# Patient Record
Sex: MALE | Race: WHITE | Employment: FULL TIME | URBAN - METROPOLITAN AREA
[De-identification: names, ages, dates, MRNs, and addresses within clinical notes are randomized per-mention and may not be internally consistent; named-entity substitution may affect disease eponyms.]

---

## 2017-07-24 ENCOUNTER — TELEPHONE (OUTPATIENT)
Dept: FAMILY MEDICINE CLINIC | Facility: CLINIC | Age: 57
End: 2017-07-24

## 2017-07-24 NOTE — TELEPHONE ENCOUNTER
In epic it shows that patient is due for a colonoscopy. Need to know if patient has every had a colonoscopy and if so with who and when. Left message for patient to call the office back.

## 2017-09-23 ENCOUNTER — TELEPHONE (OUTPATIENT)
Dept: FAMILY MEDICINE CLINIC | Facility: CLINIC | Age: 57
End: 2017-09-23

## 2017-09-23 DIAGNOSIS — Z00.00 LABORATORY EXAMINATION ORDERED AS PART OF A ROUTINE GENERAL MEDICAL EXAMINATION: Primary | ICD-10-CM

## 2017-09-23 NOTE — TELEPHONE ENCOUNTER
Patient's wife scheduled his physical for 12/27/17 with Dr. Abdulaziz Holder and would like labs called into [facility].

## 2017-10-09 ENCOUNTER — TELEPHONE (OUTPATIENT)
Dept: FAMILY MEDICINE CLINIC | Facility: CLINIC | Age: 57
End: 2017-10-09

## 2017-10-09 NOTE — TELEPHONE ENCOUNTER
Future Appointments  Date Time Provider Refugio Scanloni   12/27/2017 9:30 AM Ally Starkey MD [facility] 3 [facility]     LOV 12/14/16. FIT testing was ordered 12/14/16. Please ask pt if he still has FIT kit.  If not, we can mail it to hime and ask that pt co

## 2017-12-15 ENCOUNTER — LABORATORY ENCOUNTER (OUTPATIENT)
Dept: LAB | Age: 57
End: 2017-12-15
Attending: FAMILY MEDICINE
Payer: COMMERCIAL

## 2017-12-15 DIAGNOSIS — Z00.00 LABORATORY EXAMINATION ORDERED AS PART OF A ROUTINE GENERAL MEDICAL EXAMINATION: ICD-10-CM

## 2017-12-15 PROCEDURE — 86803 HEPATITIS C AB TEST: CPT

## 2017-12-15 PROCEDURE — 84443 ASSAY THYROID STIM HORMONE: CPT

## 2017-12-15 PROCEDURE — 85025 COMPLETE CBC W/AUTO DIFF WBC: CPT

## 2017-12-15 PROCEDURE — 82306 VITAMIN D 25 HYDROXY: CPT

## 2017-12-15 PROCEDURE — 36415 COLL VENOUS BLD VENIPUNCTURE: CPT

## 2017-12-15 PROCEDURE — 80061 LIPID PANEL: CPT

## 2017-12-15 PROCEDURE — 84153 ASSAY OF PSA TOTAL: CPT

## 2017-12-15 PROCEDURE — 80053 COMPREHEN METABOLIC PANEL: CPT

## 2017-12-27 ENCOUNTER — OFFICE VISIT (OUTPATIENT)
Dept: FAMILY MEDICINE CLINIC | Facility: CLINIC | Age: 57
End: 2017-12-27

## 2017-12-27 VITALS
HEIGHT: 73 IN | DIASTOLIC BLOOD PRESSURE: 70 MMHG | WEIGHT: 236 LBS | HEART RATE: 80 BPM | RESPIRATION RATE: 16 BRPM | TEMPERATURE: 98 F | BODY MASS INDEX: 31.28 KG/M2 | SYSTOLIC BLOOD PRESSURE: 132 MMHG

## 2017-12-27 DIAGNOSIS — D69.6 THROMBOCYTOPENIA (HCC): ICD-10-CM

## 2017-12-27 DIAGNOSIS — I10 HYPERTENSION GOAL BP (BLOOD PRESSURE) < 140/90: ICD-10-CM

## 2017-12-27 DIAGNOSIS — E78.5 HYPERLIPIDEMIA WITH TARGET LDL LESS THAN 130: ICD-10-CM

## 2017-12-27 DIAGNOSIS — Z00.00 ANNUAL PHYSICAL EXAM: Primary | ICD-10-CM

## 2017-12-27 PROCEDURE — 99396 PREV VISIT EST AGE 40-64: CPT | Performed by: FAMILY MEDICINE

## 2017-12-27 PROCEDURE — 90471 IMMUNIZATION ADMIN: CPT | Performed by: FAMILY MEDICINE

## 2017-12-27 PROCEDURE — 90686 IIV4 VACC NO PRSV 0.5 ML IM: CPT | Performed by: FAMILY MEDICINE

## 2017-12-27 RX ORDER — LOSARTAN POTASSIUM 25 MG/1
25 TABLET ORAL
Qty: 90 TABLET | Refills: 3 | Status: SHIPPED | OUTPATIENT
Start: 2017-12-27 | End: 2018-12-28

## 2017-12-27 RX ORDER — ATORVASTATIN CALCIUM 40 MG/1
40 TABLET, FILM COATED ORAL DAILY
Qty: 90 TABLET | Refills: 3 | Status: SHIPPED | OUTPATIENT
Start: 2017-12-27 | End: 2018-12-28

## 2017-12-27 NOTE — PROGRESS NOTES
Teresa Caraballo is a 59year old male who presents for a complete physical exam.   HPI:   No chief complaint on file.       His last annual assessment has been over 1 year: Annual Physical due on 12/14/2017       Pt complains of doing well, stable HTN and lip 07:12 AM   TP 7.7 12/15/2017 07:12 AM   ALKPHO 74 12/15/2017 07:12 AM   AST 18 12/15/2017 07:12 AM   ALT 39 12/15/2017 07:12 AM   BILT 1.3 12/15/2017 07:12 AM   TSH 1.690 12/15/2017 07:12 AM         Lab Results  Component Value Date/Time   CHOLEST 145 12/1 Exercise: three times per week, walking. Diet: watches minimally    REVIEW OF SYSTEMS:   A comprehensive 14 point review of systems was completed. Pertinent positives and negatives noted in the the HPI.  Specifically:  GEN:  No fever or fatigue  HEAD: Abdominal: Soft. Normal appearance and bowel sounds are normal. He exhibits no distension and no mass. There is no hepatosplenomegaly. There is no tenderness. No hernia.  Hernia confirmed negative in the right inguinal area and confirmed negative in the l Medications    atorvastatin 40 MG Oral Tab    Hypertension goal BP (blood pressure) < 140/90    Overview     Losartan 25         Current Assessment & Plan     Stable, Continue present management.     Blood Pressure and Cardiac Medications          Losartan

## 2017-12-27 NOTE — ASSESSMENT & PLAN NOTE
Stable, Continue present management.     Blood Pressure and Cardiac Medications          Losartan Potassium 25 MG Oral Tab

## 2018-05-22 ENCOUNTER — PATIENT OUTREACH (OUTPATIENT)
Dept: FAMILY MEDICINE CLINIC | Facility: CLINIC | Age: 58
End: 2018-05-22

## 2018-05-22 NOTE — PROGRESS NOTES
Patient is due for colonoscopy. Left message for patient to call the office. Patient may get FOBT card or colonoscopy.

## 2018-07-12 ENCOUNTER — PATIENT OUTREACH (OUTPATIENT)
Dept: FAMILY MEDICINE CLINIC | Facility: CLINIC | Age: 58
End: 2018-07-12

## 2018-08-09 DIAGNOSIS — Z12.11 SCREEN FOR COLON CANCER: Primary | ICD-10-CM

## 2018-09-10 ENCOUNTER — TELEPHONE (OUTPATIENT)
Dept: FAMILY MEDICINE CLINIC | Facility: CLINIC | Age: 58
End: 2018-09-10

## 2018-09-10 DIAGNOSIS — Z00.00 LABORATORY EXAMINATION ORDERED AS PART OF A ROUTINE GENERAL MEDICAL EXAMINATION: Primary | ICD-10-CM

## 2018-09-10 NOTE — TELEPHONE ENCOUNTER
Pt scheduled his physical for 12/28/18 with Dr. Mario Alberto Gonsalez, please call labs into [facility]. Thank you.

## 2018-10-23 ENCOUNTER — TELEPHONE (OUTPATIENT)
Dept: FAMILY MEDICINE CLINIC | Facility: CLINIC | Age: 58
End: 2018-10-23

## 2018-10-23 DIAGNOSIS — Z12.11 COLON CANCER SCREENING: Primary | ICD-10-CM

## 2018-10-23 NOTE — TELEPHONE ENCOUNTER
Future Appointments   Date Time Provider Refugio Scanloni   12/28/2018  8:30 AM Flako Heredia MD [facility] 3 [facility]     My Chart message sent to pt requesting that he complete FIT testing. Order entered for FIT testing. FIT kit mailed to pt.

## 2018-12-10 ENCOUNTER — APPOINTMENT (OUTPATIENT)
Dept: LAB | Facility: HOSPITAL | Age: 58
End: 2018-12-10
Attending: FAMILY MEDICINE
Payer: COMMERCIAL

## 2018-12-10 DIAGNOSIS — Z12.11 COLON CANCER SCREENING: ICD-10-CM

## 2018-12-10 PROCEDURE — 82274 ASSAY TEST FOR BLOOD FECAL: CPT

## 2018-12-12 ENCOUNTER — APPOINTMENT (OUTPATIENT)
Dept: LAB | Age: 58
End: 2018-12-12
Attending: FAMILY MEDICINE
Payer: COMMERCIAL

## 2018-12-12 DIAGNOSIS — Z00.00 LABORATORY EXAMINATION ORDERED AS PART OF A ROUTINE GENERAL MEDICAL EXAMINATION: ICD-10-CM

## 2018-12-12 PROCEDURE — 80061 LIPID PANEL: CPT

## 2018-12-12 PROCEDURE — 86140 C-REACTIVE PROTEIN: CPT

## 2018-12-12 PROCEDURE — 85652 RBC SED RATE AUTOMATED: CPT

## 2018-12-12 PROCEDURE — 80053 COMPREHEN METABOLIC PANEL: CPT

## 2018-12-12 PROCEDURE — 85027 COMPLETE CBC AUTOMATED: CPT

## 2018-12-12 PROCEDURE — 84443 ASSAY THYROID STIM HORMONE: CPT

## 2018-12-28 ENCOUNTER — OFFICE VISIT (OUTPATIENT)
Dept: FAMILY MEDICINE CLINIC | Facility: CLINIC | Age: 58
End: 2018-12-28

## 2018-12-28 VITALS
WEIGHT: 238 LBS | TEMPERATURE: 97 F | RESPIRATION RATE: 16 BRPM | DIASTOLIC BLOOD PRESSURE: 70 MMHG | HEIGHT: 73 IN | SYSTOLIC BLOOD PRESSURE: 122 MMHG | BODY MASS INDEX: 31.54 KG/M2 | HEART RATE: 70 BPM

## 2018-12-28 DIAGNOSIS — I10 HYPERTENSION GOAL BP (BLOOD PRESSURE) < 140/90: ICD-10-CM

## 2018-12-28 DIAGNOSIS — D69.6 THROMBOCYTOPENIA (HCC): ICD-10-CM

## 2018-12-28 DIAGNOSIS — E88.81 METABOLIC SYNDROME: ICD-10-CM

## 2018-12-28 DIAGNOSIS — E78.5 HYPERLIPIDEMIA WITH TARGET LDL LESS THAN 130: ICD-10-CM

## 2018-12-28 DIAGNOSIS — Z00.00 ANNUAL PHYSICAL EXAM: Primary | ICD-10-CM

## 2018-12-28 PROCEDURE — 90471 IMMUNIZATION ADMIN: CPT | Performed by: FAMILY MEDICINE

## 2018-12-28 PROCEDURE — 90686 IIV4 VACC NO PRSV 0.5 ML IM: CPT | Performed by: FAMILY MEDICINE

## 2018-12-28 PROCEDURE — 99396 PREV VISIT EST AGE 40-64: CPT | Performed by: FAMILY MEDICINE

## 2018-12-28 RX ORDER — LOSARTAN POTASSIUM 25 MG/1
25 TABLET ORAL
Qty: 90 TABLET | Refills: 3 | Status: SHIPPED | OUTPATIENT
Start: 2018-12-28 | End: 2019-12-30

## 2018-12-28 RX ORDER — ATORVASTATIN CALCIUM 40 MG/1
40 TABLET, FILM COATED ORAL DAILY
Qty: 90 TABLET | Refills: 3 | Status: SHIPPED | OUTPATIENT
Start: 2018-12-28 | End: 2020-01-20

## 2018-12-28 NOTE — PROGRESS NOTES
Lizet Engel is a 59year old male who presents for a complete physical exam.   HPI:   Patient presents with:  Physical  Imm/Inj: Flu Shot      His last annual assessment has been over 1 year: Annual Physical due on 12/27/2018       Pt complains of doing 9.0 12/12/2018 07:44 AM    ALB 3.7 12/12/2018 07:44 AM    TP 7.5 12/12/2018 07:44 AM    ALKPHO 82 12/12/2018 07:44 AM    AST 20 12/12/2018 07:44 AM    ALT 35 12/12/2018 07:44 AM    BILT 1.2 12/12/2018 07:44 AM    TSH 2.290 12/12/2018 07:44 AM       Lab Res week, walking. Diet: watches minimally    REVIEW OF SYSTEMS:   A comprehensive 14 point review of systems was completed. Pertinent positives and negatives noted in the the HPI.  Specifically:  GEN:  No fever or fatigue  HEAD:  No headaches  EYES:  No visi present. Carotid bruit not present. Pulmonary/Chest: Effort normal and breath sounds normal. No respiratory distress. He has no decreased breath sounds. He has no wheezes. He has no rales. He exhibits no tenderness. Abdominal: Soft.  Normal appearance and • >=3 YRS TRI  MULTIDOSE VIAL (71529) FLU CLINIC 12/03/2014      Colonoscopy due on 12/06/1960  Influenza Vaccine(1) due on 09/01/2018  Annual Depression Screen due on 12/27/2018  Annual Physical due on 12/27/2018  FIT Colorectal Screening due on 12/10/2

## 2019-03-11 ENCOUNTER — TELEPHONE (OUTPATIENT)
Dept: FAMILY MEDICINE CLINIC | Facility: CLINIC | Age: 59
End: 2019-03-11

## 2019-03-11 NOTE — TELEPHONE ENCOUNTER
LOV 12/28/18. No future appointments. Drake notified pt that his Losartan is on recall. Please prescribe another medication. Routed to Dr Lia Cervantes.

## 2019-03-11 NOTE — TELEPHONE ENCOUNTER
[address] called stating that the patient is on the Losartan and his script that was filled in December was part of the batch recalled. Please call in a new script for a new prescription.

## 2019-04-17 ENCOUNTER — OFFICE VISIT (OUTPATIENT)
Dept: FAMILY MEDICINE CLINIC | Facility: CLINIC | Age: 59
End: 2019-04-17

## 2019-04-17 ENCOUNTER — TELEPHONE (OUTPATIENT)
Dept: FAMILY MEDICINE CLINIC | Facility: CLINIC | Age: 59
End: 2019-04-17

## 2019-04-17 VITALS
WEIGHT: 245 LBS | OXYGEN SATURATION: 96 % | HEART RATE: 101 BPM | SYSTOLIC BLOOD PRESSURE: 126 MMHG | DIASTOLIC BLOOD PRESSURE: 74 MMHG | HEIGHT: 73 IN | BODY MASS INDEX: 32.47 KG/M2 | TEMPERATURE: 100 F

## 2019-04-17 DIAGNOSIS — R68.89 FLU-LIKE SYMPTOMS: Primary | ICD-10-CM

## 2019-04-17 DIAGNOSIS — J10.1 INFLUENZA A: ICD-10-CM

## 2019-04-17 PROCEDURE — 99213 OFFICE O/P EST LOW 20 MIN: CPT | Performed by: FAMILY MEDICINE

## 2019-04-17 PROCEDURE — 87502 INFLUENZA DNA AMP PROBE: CPT | Performed by: FAMILY MEDICINE

## 2019-04-17 RX ORDER — PREDNISONE 20 MG/1
TABLET ORAL
Qty: 10 TABLET | Refills: 0 | Status: SHIPPED | OUTPATIENT
Start: 2019-04-17 | End: 2019-12-04

## 2019-04-17 NOTE — TELEPHONE ENCOUNTER
Spouse Aundrea Avalos calling that pt has a weird cough and fever of 102. Requesting to speak to a nurse.  [facility] [place], [facility]

## 2019-04-17 NOTE — TELEPHONE ENCOUNTER
C/o cold started last week has fever for which she is giving ASA I told her to switch to tylenol he has a dry barky cough for which she has been giving generic robitussin cold.  With the fever and cough I sent her to the urgent care on [address] to be seen she

## 2019-04-17 NOTE — PATIENT INSTRUCTIONS
Take xofluza as directed. Take prednisone-- 2 tabs once daily for 5 days. Take with food. Use otc meds for comfort:  Tylenol for pain and fever. otc cough remedies like delsym will reduce coughing without adding mucus.    Consider applying richie's vap will not make the flu go away faster. But the medicines may help with coughing, sore throat, and congestion in your nose and sinuses. Don’t use a decongestant if you have high blood pressure.   · Stay home until your fever has been gone for at least 24 hour

## 2019-10-21 ENCOUNTER — TELEPHONE (OUTPATIENT)
Dept: FAMILY MEDICINE CLINIC | Facility: CLINIC | Age: 59
End: 2019-10-21

## 2019-10-21 DIAGNOSIS — Z12.5 SCREENING FOR PROSTATE CANCER: ICD-10-CM

## 2019-10-21 DIAGNOSIS — Z00.00 LABORATORY EXAMINATION ORDERED AS PART OF A ROUTINE GENERAL MEDICAL EXAMINATION: ICD-10-CM

## 2019-10-21 NOTE — TELEPHONE ENCOUNTER
Please enter lab orders for the patient's upcoming physical appointment. Physical scheduled:    Your appointments     Date & Time Appointment Department [facility])    Dec 18, 2019 11:45 AM CST Physical - Established with Celeste Tesfaye  [facility] [place]

## 2019-10-22 NOTE — TELEPHONE ENCOUNTER
Diagnoses and all orders for this visit:    Laboratory examination ordered as part of a routine general medical examination  -     COMP METABOLIC PANEL (14); Future  -     LIPID PANEL; Future  -     CBC, PLATELET; NO DIFFERENTIAL;  Future  -     TSH Santosh Harding

## 2019-12-04 ENCOUNTER — TELEPHONE (OUTPATIENT)
Dept: FAMILY MEDICINE CLINIC | Facility: CLINIC | Age: 59
End: 2019-12-04

## 2019-12-04 ENCOUNTER — OFFICE VISIT (OUTPATIENT)
Dept: FAMILY MEDICINE CLINIC | Facility: CLINIC | Age: 59
End: 2019-12-04

## 2019-12-04 ENCOUNTER — APPOINTMENT (OUTPATIENT)
Dept: LAB | Age: 59
End: 2019-12-04
Attending: FAMILY MEDICINE
Payer: COMMERCIAL

## 2019-12-04 VITALS
SYSTOLIC BLOOD PRESSURE: 120 MMHG | TEMPERATURE: 100 F | RESPIRATION RATE: 16 BRPM | DIASTOLIC BLOOD PRESSURE: 60 MMHG | HEART RATE: 72 BPM | WEIGHT: 241 LBS | BODY MASS INDEX: 31.94 KG/M2 | HEIGHT: 73 IN

## 2019-12-04 DIAGNOSIS — Z12.11 COLON CANCER SCREENING: Primary | ICD-10-CM

## 2019-12-04 DIAGNOSIS — R05.9 COUGH: Primary | ICD-10-CM

## 2019-12-04 DIAGNOSIS — Z00.00 LABORATORY EXAMINATION ORDERED AS PART OF A ROUTINE GENERAL MEDICAL EXAMINATION: ICD-10-CM

## 2019-12-04 DIAGNOSIS — Z12.5 SCREENING FOR PROSTATE CANCER: ICD-10-CM

## 2019-12-04 PROCEDURE — 84443 ASSAY THYROID STIM HORMONE: CPT

## 2019-12-04 PROCEDURE — 80053 COMPREHEN METABOLIC PANEL: CPT

## 2019-12-04 PROCEDURE — 99213 OFFICE O/P EST LOW 20 MIN: CPT | Performed by: PHYSICIAN ASSISTANT

## 2019-12-04 PROCEDURE — 80061 LIPID PANEL: CPT

## 2019-12-04 PROCEDURE — 85027 COMPLETE CBC AUTOMATED: CPT

## 2019-12-04 PROCEDURE — 82274 ASSAY TEST FOR BLOOD FECAL: CPT | Performed by: FAMILY MEDICINE

## 2019-12-04 RX ORDER — PREDNISONE 20 MG/1
20 TABLET ORAL DAILY
Qty: 5 TABLET | Refills: 0 | Status: SHIPPED | OUTPATIENT
Start: 2019-12-04 | End: 2019-12-09

## 2019-12-05 NOTE — PROGRESS NOTES
Patient presents with:  Cough: x 3 days,  cough with a funny sound       HISTORY OF PRESENT ILLNESS  Lord Aggarwal is a 59year old male who presents for evaluation of cough. Notes that for the last three days he has been having a persistent cough.  He will Temp: 100.3 °F (37.9 °C)       PHYSICAL EXAM  GENERAL: Alert male in no acute distress. HEAD: Normocephalic, atraumatic. EYES: White conjunctiva, clear sclera. PERRL. EARS: External auditory canals clear bilaterally.  No pain with manipulation of tragu

## 2019-12-18 ENCOUNTER — OFFICE VISIT (OUTPATIENT)
Dept: FAMILY MEDICINE CLINIC | Facility: CLINIC | Age: 59
End: 2019-12-18

## 2019-12-18 VITALS
RESPIRATION RATE: 12 BRPM | DIASTOLIC BLOOD PRESSURE: 82 MMHG | HEART RATE: 64 BPM | SYSTOLIC BLOOD PRESSURE: 138 MMHG | WEIGHT: 241 LBS | TEMPERATURE: 98 F | HEIGHT: 73 IN | BODY MASS INDEX: 31.94 KG/M2

## 2019-12-18 DIAGNOSIS — E78.2 MIXED HYPERLIPIDEMIA: ICD-10-CM

## 2019-12-18 DIAGNOSIS — D69.6 THROMBOCYTOPENIA (HCC): ICD-10-CM

## 2019-12-18 DIAGNOSIS — I10 ESSENTIAL HYPERTENSION: ICD-10-CM

## 2019-12-18 DIAGNOSIS — E88.81 METABOLIC SYNDROME: ICD-10-CM

## 2019-12-18 DIAGNOSIS — Z00.00 ANNUAL PHYSICAL EXAM: Primary | ICD-10-CM

## 2019-12-18 DIAGNOSIS — E66.9 OBESITY (BMI 30.0-34.9): ICD-10-CM

## 2019-12-18 PROBLEM — E66.811 OBESITY (BMI 30.0-34.9): Status: ACTIVE | Noted: 2019-12-18

## 2019-12-18 PROCEDURE — 90471 IMMUNIZATION ADMIN: CPT | Performed by: FAMILY MEDICINE

## 2019-12-18 PROCEDURE — 99396 PREV VISIT EST AGE 40-64: CPT | Performed by: FAMILY MEDICINE

## 2019-12-18 PROCEDURE — 90686 IIV4 VACC NO PRSV 0.5 ML IM: CPT | Performed by: FAMILY MEDICINE

## 2019-12-18 NOTE — PROGRESS NOTES
Velma Healy is a 62year old male who presents for a complete physical exam.   HPI:   Patient presents with:  Physical      Annual Physical due on 12/28/2019      Pt complains of doing well overall, not the best of diet but overall in good health    He s TP 7.9 12/04/2019 08:25 AM    ALKPHO 89 12/04/2019 08:25 AM    AST 17 12/04/2019 08:25 AM    ALT 25 12/04/2019 08:25 AM    BILT 1.4 12/04/2019 08:25 AM    TSH 1.410 12/04/2019 08:25 AM       Lab Results   Component Value Date/Time    CHOLEST 151 12/04/2 sore throat or dental problems  HEART:  No chest pain or palpitations  LUNG:  No SOB, cough or wheeze  GI:  No abdominal pain.   No N/V/D/C  :  No dysuria  MS:  No joint pain or swelling  NEURO:  Denies numbness or tingling  PSYCH:  No mood concerns or an motion. Lymphadenopathy:     He has no cervical adenopathy. He has no axillary adenopathy. Neurological: He is alert and oriented to person, place, and time. He has normal strength and normal reflexes. No cranial nerve deficit or sensory deficit. Assessment & Plan     Stable, Continue present management.     Cholesterol Lowering Medications          atorvastatin 40 MG Oral Tab                    Other    Metabolic syndrome    Overview     A1c 5.3 in 2013, trigs 171         Current Assessment & Plan

## 2019-12-28 DIAGNOSIS — I10 HYPERTENSION GOAL BP (BLOOD PRESSURE) < 140/90: ICD-10-CM

## 2019-12-30 RX ORDER — LOSARTAN POTASSIUM 25 MG/1
TABLET ORAL
Qty: 90 TABLET | Refills: 0 | Status: SHIPPED | OUTPATIENT
Start: 2019-12-30 | End: 2020-03-16

## 2019-12-30 NOTE — TELEPHONE ENCOUNTER
Requested Prescriptions     Pending Prescriptions Disp Refills   • LOSARTAN POTASSIUM 25 MG Oral Tab [Pharmacy Med Name: Losartan Potassium 25 MG Oral Tablet] 90 tablet 0     Sig: TAKE 1 TABLET BY MOUTH ONCE DAILY     LOV 12/18/2019     Patient was asked t

## 2020-01-10 ENCOUNTER — TELEPHONE (OUTPATIENT)
Dept: FAMILY MEDICINE CLINIC | Facility: CLINIC | Age: 60
End: 2020-01-10

## 2020-01-18 DIAGNOSIS — E78.5 HYPERLIPIDEMIA WITH TARGET LDL LESS THAN 130: ICD-10-CM

## 2020-01-20 RX ORDER — ATORVASTATIN CALCIUM 40 MG/1
TABLET, FILM COATED ORAL
Qty: 90 TABLET | Refills: 0 | Status: SHIPPED | OUTPATIENT
Start: 2020-01-20 | End: 2020-04-11

## 2020-01-20 NOTE — TELEPHONE ENCOUNTER
Requested Prescriptions     Pending Prescriptions Disp Refills   • ATORVASTATIN 40 MG Oral Tab [Pharmacy Med Name: Atorvastatin Calcium 40 MG Oral Tablet] 90 tablet 0     Sig: TAKE 1 TABLET BY MOUTH ONCE DAILY     LOV 12/18/2019     Patient was asked to fo

## 2020-03-14 DIAGNOSIS — I10 HYPERTENSION GOAL BP (BLOOD PRESSURE) < 140/90: ICD-10-CM

## 2020-03-16 RX ORDER — LOSARTAN POTASSIUM 25 MG/1
TABLET ORAL
Qty: 90 TABLET | Refills: 0 | Status: SHIPPED | OUTPATIENT
Start: 2020-03-16 | End: 2020-06-15

## 2020-03-16 NOTE — TELEPHONE ENCOUNTER
Requested Prescriptions     Pending Prescriptions Disp Refills   • LOSARTAN POTASSIUM 25 MG Oral Tab [Pharmacy Med Name: Losartan Potassium 25 MG Oral Tablet] 90 tablet 0     Sig: Take 1 tablet by mouth once daily     LOV 12/18/2019         Appointment deepti

## 2020-04-11 DIAGNOSIS — E78.5 HYPERLIPIDEMIA WITH TARGET LDL LESS THAN 130: ICD-10-CM

## 2020-04-11 RX ORDER — ATORVASTATIN CALCIUM 40 MG/1
TABLET, FILM COATED ORAL
Qty: 90 TABLET | Refills: 0 | Status: SHIPPED | OUTPATIENT
Start: 2020-04-11 | End: 2020-07-12

## 2020-06-13 DIAGNOSIS — I10 HYPERTENSION GOAL BP (BLOOD PRESSURE) < 140/90: ICD-10-CM

## 2020-06-15 RX ORDER — LOSARTAN POTASSIUM 25 MG/1
TABLET ORAL
Qty: 90 TABLET | Refills: 0 | Status: SHIPPED | OUTPATIENT
Start: 2020-06-15 | End: 2020-09-21

## 2020-06-15 NOTE — TELEPHONE ENCOUNTER
Requested Prescriptions     Pending Prescriptions Disp Refills   • LOSARTAN POTASSIUM 25 MG Oral Tab [Pharmacy Med Name: Losartan Potassium 25 MG Oral Tablet] 90 tablet 0     Sig: Take 1 tablet by mouth once daily     LOV 12/18/2019       Appointment sched

## 2020-07-11 DIAGNOSIS — E78.5 HYPERLIPIDEMIA WITH TARGET LDL LESS THAN 130: ICD-10-CM

## 2020-07-12 RX ORDER — ATORVASTATIN CALCIUM 40 MG/1
TABLET, FILM COATED ORAL
Qty: 90 TABLET | Refills: 3 | Status: SHIPPED | OUTPATIENT
Start: 2020-07-12 | End: 2021-07-14

## 2020-08-25 ENCOUNTER — OFFICE VISIT (OUTPATIENT)
Dept: FAMILY MEDICINE CLINIC | Facility: CLINIC | Age: 60
End: 2020-08-25

## 2020-08-25 VITALS
HEIGHT: 72.5 IN | DIASTOLIC BLOOD PRESSURE: 76 MMHG | HEART RATE: 72 BPM | RESPIRATION RATE: 16 BRPM | BODY MASS INDEX: 31.94 KG/M2 | SYSTOLIC BLOOD PRESSURE: 134 MMHG | WEIGHT: 238.38 LBS | TEMPERATURE: 98 F

## 2020-08-25 DIAGNOSIS — M62.830 BACK MUSCLE SPASM: Primary | ICD-10-CM

## 2020-08-25 PROCEDURE — 3078F DIAST BP <80 MM HG: CPT | Performed by: FAMILY MEDICINE

## 2020-08-25 PROCEDURE — 3008F BODY MASS INDEX DOCD: CPT | Performed by: FAMILY MEDICINE

## 2020-08-25 PROCEDURE — 99213 OFFICE O/P EST LOW 20 MIN: CPT | Performed by: FAMILY MEDICINE

## 2020-08-25 PROCEDURE — 3075F SYST BP GE 130 - 139MM HG: CPT | Performed by: FAMILY MEDICINE

## 2020-08-25 RX ORDER — CYCLOBENZAPRINE HCL 10 MG
10 TABLET ORAL 3 TIMES DAILY PRN
Qty: 30 TABLET | Refills: 0 | Status: SHIPPED | OUTPATIENT
Start: 2020-08-25 | End: 2020-09-14

## 2020-08-25 NOTE — PROGRESS NOTES
Patient presents with:  Back Pain: x9 days, low back pain. HPI:   Luz Enriquez is a 62year old male who presents to the office for eval of low back pain.   Lifting water in the garden, no pop or quick pain, but later that night felt the pain and tigh

## 2020-09-19 DIAGNOSIS — I10 HYPERTENSION GOAL BP (BLOOD PRESSURE) < 140/90: ICD-10-CM

## 2020-09-21 RX ORDER — LOSARTAN POTASSIUM 25 MG/1
TABLET ORAL
Qty: 90 TABLET | Refills: 0 | Status: SHIPPED | OUTPATIENT
Start: 2020-09-21 | End: 2020-12-21

## 2020-09-21 NOTE — TELEPHONE ENCOUNTER
Requested Prescriptions     Pending Prescriptions Disp Refills   • LOSARTAN POTASSIUM 25 MG Oral Tab [Pharmacy Med Name: Losartan Potassium 25 MG Oral Tablet] 90 tablet 0     Sig: Take 1 tablet by mouth once daily     LOV 8/25/2020         Appointment sche

## 2020-11-20 ENCOUNTER — TELEPHONE (OUTPATIENT)
Dept: FAMILY MEDICINE CLINIC | Facility: CLINIC | Age: 60
End: 2020-11-20

## 2020-11-20 DIAGNOSIS — R73.9 HYPERGLYCEMIA: ICD-10-CM

## 2020-11-20 DIAGNOSIS — Z12.11 SCREEN FOR COLON CANCER: Primary | ICD-10-CM

## 2020-11-20 DIAGNOSIS — Z00.00 LABORATORY EXAMINATION ORDERED AS PART OF A ROUTINE GENERAL MEDICAL EXAMINATION: ICD-10-CM

## 2020-11-20 NOTE — TELEPHONE ENCOUNTER
Pt has an appointment scheduled for a physical 12/23 with Dr. Drew Niño and spouse called asking if there's any way pt can complete FIT test prior to visit?

## 2020-11-20 NOTE — TELEPHONE ENCOUNTER
FIT Test Envelope placed in the drawer by Check In for Luis to .     Please order labs for Luis's upcoming physical 12/23/2020    Routed to Mariangel Bueno

## 2020-11-20 NOTE — TELEPHONE ENCOUNTER
Please enter lab orders for the patient's upcoming physical appointment. Physical scheduled:    Your appointments     Date & Time Appointment Department [facility])    Dec 23, 2020  8:00 AM CST Physical - Established with Gene Quarles  [facility]

## 2020-12-09 ENCOUNTER — LAB ENCOUNTER (OUTPATIENT)
Dept: LAB | Age: 60
End: 2020-12-09
Attending: FAMILY MEDICINE
Payer: COMMERCIAL

## 2020-12-09 DIAGNOSIS — Z00.00 LABORATORY EXAMINATION ORDERED AS PART OF A ROUTINE GENERAL MEDICAL EXAMINATION: ICD-10-CM

## 2020-12-09 DIAGNOSIS — R73.9 HYPERGLYCEMIA: ICD-10-CM

## 2020-12-09 PROCEDURE — 84443 ASSAY THYROID STIM HORMONE: CPT

## 2020-12-09 PROCEDURE — 80061 LIPID PANEL: CPT

## 2020-12-09 PROCEDURE — 83036 HEMOGLOBIN GLYCOSYLATED A1C: CPT

## 2020-12-09 PROCEDURE — 80053 COMPREHEN METABOLIC PANEL: CPT

## 2020-12-09 PROCEDURE — 36415 COLL VENOUS BLD VENIPUNCTURE: CPT

## 2020-12-09 PROCEDURE — 85027 COMPLETE CBC AUTOMATED: CPT

## 2020-12-19 DIAGNOSIS — I10 HYPERTENSION GOAL BP (BLOOD PRESSURE) < 140/90: ICD-10-CM

## 2020-12-21 RX ORDER — LOSARTAN POTASSIUM 25 MG/1
TABLET ORAL
Qty: 90 TABLET | Refills: 0 | Status: SHIPPED | OUTPATIENT
Start: 2020-12-21 | End: 2021-03-08

## 2020-12-21 NOTE — TELEPHONE ENCOUNTER
Requested Prescriptions     Pending Prescriptions Disp Refills   • LOSARTAN POTASSIUM 25 MG Oral Tab [Pharmacy Med Name: Losartan Potassium 25 MG Oral Tablet] 90 tablet 0     Sig: Take 1 tablet by mouth once daily     LOV 8/25/2020     Patient was asked to

## 2020-12-23 ENCOUNTER — OFFICE VISIT (OUTPATIENT)
Dept: FAMILY MEDICINE CLINIC | Facility: CLINIC | Age: 60
End: 2020-12-23

## 2020-12-23 VITALS
WEIGHT: 239 LBS | HEART RATE: 80 BPM | RESPIRATION RATE: 14 BRPM | SYSTOLIC BLOOD PRESSURE: 128 MMHG | BODY MASS INDEX: 32 KG/M2 | DIASTOLIC BLOOD PRESSURE: 70 MMHG | TEMPERATURE: 97 F | OXYGEN SATURATION: 94 %

## 2020-12-23 DIAGNOSIS — D69.6 THROMBOCYTOPENIA (HCC): ICD-10-CM

## 2020-12-23 DIAGNOSIS — I10 ESSENTIAL HYPERTENSION: ICD-10-CM

## 2020-12-23 DIAGNOSIS — E88.81 METABOLIC SYNDROME: ICD-10-CM

## 2020-12-23 DIAGNOSIS — Z80.42 FAMILY HISTORY OF PROSTATE CANCER IN FATHER: ICD-10-CM

## 2020-12-23 DIAGNOSIS — E78.2 MIXED HYPERLIPIDEMIA: ICD-10-CM

## 2020-12-23 DIAGNOSIS — Z00.00 ANNUAL PHYSICAL EXAM: Primary | ICD-10-CM

## 2020-12-23 DIAGNOSIS — Z12.11 SCREENING FOR MALIGNANT NEOPLASM OF COLON: ICD-10-CM

## 2020-12-23 PROCEDURE — 90471 IMMUNIZATION ADMIN: CPT | Performed by: FAMILY MEDICINE

## 2020-12-23 PROCEDURE — 3078F DIAST BP <80 MM HG: CPT | Performed by: FAMILY MEDICINE

## 2020-12-23 PROCEDURE — 90686 IIV4 VACC NO PRSV 0.5 ML IM: CPT | Performed by: FAMILY MEDICINE

## 2020-12-23 PROCEDURE — 3074F SYST BP LT 130 MM HG: CPT | Performed by: FAMILY MEDICINE

## 2020-12-23 PROCEDURE — 99396 PREV VISIT EST AGE 40-64: CPT | Performed by: FAMILY MEDICINE

## 2020-12-23 NOTE — PROGRESS NOTES
Mirian Metcalf is a 56year old male who presents for a complete physical exam.   HPI:   Patient presents with:  Physical  Immunization/Injection: Flu Shot      His last annual assessment has been over 1 year: Annual Physical due on 12/18/2020       Pt comp 12/09/2020 08:49 AM    CREATSERUM 1.17 12/09/2020 08:49 AM    GFR 73 12/15/2017 07:12 AM    CA 9.4 12/09/2020 08:49 AM    ALB 4.2 12/09/2020 08:49 AM    TP 7.9 12/09/2020 08:49 AM    ALKPHO 78 12/09/2020 08:49 AM    AST 25 12/09/2020 08:49 AM    ALT 39 12/ OF SYSTEMS:   A comprehensive 14 point review of systems was completed. Pertinent positives and negatives noted in the the HPI.  Specifically:  GEN:  No fever or fatigue  HEAD:  No headaches  EYES:  No vision change  EARS:  No hearing loss  MOUTH/THROAT: wheezes. He has no rales. He exhibits no tenderness. Abdominal: Soft. Bowel sounds are normal. He exhibits no distension. There is no hepatosplenomegaly. There is no abdominal tenderness. There is no rebound and no guarding. No hernia.  Musculoskeletal: N CPX in 1 years. Assessment:  Problem List Items Addressed This Visit        Cardiovascular    Essential hypertension    Overview     Losartan 25         Current Assessment & Plan     Stable, Continue present management.     Blood Pressure and Cardiac Med

## 2021-03-06 DIAGNOSIS — I10 HYPERTENSION GOAL BP (BLOOD PRESSURE) < 140/90: ICD-10-CM

## 2021-03-08 RX ORDER — LOSARTAN POTASSIUM 25 MG/1
TABLET ORAL
Qty: 90 TABLET | Refills: 0 | Status: SHIPPED | OUTPATIENT
Start: 2021-03-08 | End: 2021-06-22

## 2021-03-08 NOTE — TELEPHONE ENCOUNTER
Requested Prescriptions     Pending Prescriptions Disp Refills   • LOSARTAN POTASSIUM 25 MG Oral Tab [Pharmacy Med Name: Losartan Potassium 25 MG Oral Tablet] 90 tablet 0     Sig: Take 1 tablet by mouth once daily     LOV 12/23/2020     Patient was asked t

## 2021-06-04 ENCOUNTER — HOSPITAL ENCOUNTER (OUTPATIENT)
Age: 61
Discharge: HOME | End: 2021-06-04
Payer: COMMERCIAL

## 2021-06-04 VITALS
HEART RATE: 68 BPM | BODY MASS INDEX: 31.14 KG/M2 | HEIGHT: 73 IN | OXYGEN SATURATION: 96 % | DIASTOLIC BLOOD PRESSURE: 84 MMHG | RESPIRATION RATE: 16 BRPM | TEMPERATURE: 97 F | WEIGHT: 235 LBS | SYSTOLIC BLOOD PRESSURE: 157 MMHG

## 2021-06-04 DIAGNOSIS — H61.23 BILATERAL IMPACTED CERUMEN: Primary | ICD-10-CM

## 2021-06-04 DIAGNOSIS — H65.02 NON-RECURRENT ACUTE SEROUS OTITIS MEDIA OF LEFT EAR: ICD-10-CM

## 2021-06-04 PROCEDURE — 69210 REMOVE IMPACTED EAR WAX UNI: CPT | Performed by: NURSE PRACTITIONER

## 2021-06-04 PROCEDURE — 99213 OFFICE O/P EST LOW 20 MIN: CPT | Performed by: NURSE PRACTITIONER

## 2021-06-04 RX ORDER — AMOXICILLIN 875 MG/1
875 TABLET, COATED ORAL 2 TIMES DAILY
Qty: 20 TABLET | Refills: 0 | Status: SHIPPED | OUTPATIENT
Start: 2021-06-04 | End: 2021-06-14

## 2021-06-04 NOTE — ED PROVIDER NOTES
Patient Seen in: Immediate [address] [place] [address]      History   Patient presents with:  Ear Problem Pain    Stated Complaint: Ear Infection    HPI/Subjective:   49-year-old male presents to immediate care with right ear pain.   Patient states in the past Pulse 68   Temp 97.1 °F (36.2 °C) (Temporal)   Resp 16   Ht 185.4 cm (6' 1\")   Wt 106.6 kg   SpO2 96%   BMI 31.00 kg/m²         Physical Exam  Vitals and nursing note reviewed. Constitutional:       General: He is not in acute distress.      Appearance: importance of following up with Primary care physicican. The patient verbalized understanding of the discharge instructions and plan.                            Disposition and Plan     Clinical Impression:  Bilateral impacted cerumen  (primary encounter d

## 2021-06-20 DIAGNOSIS — I10 HYPERTENSION GOAL BP (BLOOD PRESSURE) < 140/90: ICD-10-CM

## 2021-06-22 RX ORDER — LOSARTAN POTASSIUM 25 MG/1
TABLET ORAL
Qty: 90 TABLET | Refills: 0 | Status: SHIPPED | OUTPATIENT
Start: 2021-06-22 | End: 2021-09-13

## 2021-06-25 ENCOUNTER — TELEPHONE (OUTPATIENT)
Dept: FAMILY MEDICINE CLINIC | Facility: CLINIC | Age: 61
End: 2021-06-25

## 2021-06-25 DIAGNOSIS — H53.9 VISION CHANGES: Primary | ICD-10-CM

## 2021-06-25 NOTE — TELEPHONE ENCOUNTER
1. Vision changes (Primary)  -     OPHTHALMOLOGY - INTERNAL   OK for Dr Chris Stark group thanks    OK to notify.  Thanks, Maco Liu MD

## 2021-06-25 NOTE — TELEPHONE ENCOUNTER
Wife calling with c/o L eye tunnel vision x 3 days. In the morning only. Once a day. Wife would like to know if he needs to see pcp or optometry?  Please advise

## 2021-06-30 ENCOUNTER — TELEPHONE (OUTPATIENT)
Dept: FAMILY MEDICINE CLINIC | Facility: CLINIC | Age: 61
End: 2021-06-30

## 2021-06-30 DIAGNOSIS — H53.40 VISUAL FIELD DEFECT: Primary | ICD-10-CM

## 2021-06-30 NOTE — TELEPHONE ENCOUNTER
Patient was having issues with his tunnel vision was referred to ophthalmologist, had appointment today told everything ok with eyes but recommended to have doppler cartoid scan for plaque.  Patient will be dropping off copy of paperwork from eye doctor for

## 2021-06-30 NOTE — TELEPHONE ENCOUNTER
Phone call to pt and pt aware of orders for US of head/ neck  Pt Will call central [facility]

## 2021-06-30 NOTE — TELEPHONE ENCOUNTER
1. Visual field defect (Primary)  -     Ultrasound Scan Head or Neck; Future; Expected date: 06/30/2021       OK to notify.  Thanks, Vera Dawn MD

## 2021-07-11 DIAGNOSIS — E78.5 HYPERLIPIDEMIA WITH TARGET LDL LESS THAN 130: ICD-10-CM

## 2021-07-13 ENCOUNTER — HOSPITAL ENCOUNTER (OUTPATIENT)
Dept: ULTRASOUND IMAGING | Age: 61
Discharge: HOME | End: 2021-07-13
Attending: FAMILY MEDICINE
Payer: COMMERCIAL

## 2021-07-13 DIAGNOSIS — H53.40 VISUAL FIELD DEFECT: ICD-10-CM

## 2021-07-13 PROCEDURE — 93880 EXTRACRANIAL BILAT STUDY: CPT | Performed by: FAMILY MEDICINE

## 2021-07-14 RX ORDER — ATORVASTATIN CALCIUM 40 MG/1
TABLET, FILM COATED ORAL
Qty: 90 TABLET | Refills: 1 | Status: SHIPPED | OUTPATIENT
Start: 2021-07-14 | End: 2021-12-08

## 2021-07-14 NOTE — TELEPHONE ENCOUNTER
LOV 12/23/2020 physical w/Glas  NOTED    Mixed hyperlipidemia      Overview       Atorvastatin 40           Current Assessment & Plan       Stable, Continue present management.          Last lipid 12/09/2020- abnormal  Will refill until anniversary of physi

## 2021-07-14 NOTE — TELEPHONE ENCOUNTER
Atorvastatin Calcium 40 MG Oral Tablet     Sig: Take 1 tablet by mouth once daily    Disp:  90 tablet    Refills:  0    Start: 7/11/2021    Class: Normal    Non-formulary For: Hyperlipidemia with target LDL less than 130    Last ordered: 1 year ago by Paulina

## 2021-08-04 ENCOUNTER — TELEPHONE (OUTPATIENT)
Dept: FAMILY MEDICINE CLINIC | Facility: CLINIC | Age: 61
End: 2021-08-04

## 2021-08-04 DIAGNOSIS — Z00.00 LABORATORY EXAMINATION ORDERED AS PART OF A ROUTINE GENERAL MEDICAL EXAMINATION: ICD-10-CM

## 2021-08-04 DIAGNOSIS — Z12.5 SCREENING FOR PROSTATE CANCER: ICD-10-CM

## 2021-08-04 NOTE — TELEPHONE ENCOUNTER
Please enter lab orders for the patient's upcoming physical appointment. Physical scheduled:    Your appointments     Date & Time Appointment Department [facility])    Dec 08, 2021  8:30 AM CST Physical - Established with Sundeep Liu  [facility]

## 2021-09-07 DIAGNOSIS — I10 HYPERTENSION GOAL BP (BLOOD PRESSURE) < 140/90: ICD-10-CM

## 2021-09-13 RX ORDER — LOSARTAN POTASSIUM 25 MG/1
TABLET ORAL
Qty: 90 TABLET | Refills: 0 | Status: SHIPPED | OUTPATIENT
Start: 2021-09-13 | End: 2021-12-08

## 2021-09-13 NOTE — TELEPHONE ENCOUNTER
Losartan Potassium 25 MG Oral Tablet     Sig: Take 1 tablet by mouth once daily    Disp:  90 tablet    Refills:  0    Start: 9/7/2021    Class: Normal    Non-formulary For: Hypertension goal BP (blood pressure) < 140/90    Last ordered: 2 months ago by Wallace Adorno

## 2021-11-08 NOTE — TELEPHONE ENCOUNTER
1. Laboratory examination ordered as part of a routine general medical examination  -     Comp Metabolic Panel (14); Future; Expected date: 11/08/2021  -     Lipid Panel;  Future; Expected date: 11/08/2021  -     TSH W Reflex To Free T4; Future; Expected da

## 2021-11-17 ENCOUNTER — LAB ENCOUNTER (OUTPATIENT)
Dept: LAB | Age: 61
End: 2021-11-17
Attending: FAMILY MEDICINE
Payer: COMMERCIAL

## 2021-11-17 DIAGNOSIS — Z12.5 SCREENING FOR PROSTATE CANCER: ICD-10-CM

## 2021-11-17 DIAGNOSIS — Z00.00 LABORATORY EXAMINATION ORDERED AS PART OF A ROUTINE GENERAL MEDICAL EXAMINATION: ICD-10-CM

## 2021-11-17 PROCEDURE — 36415 COLL VENOUS BLD VENIPUNCTURE: CPT

## 2021-11-17 PROCEDURE — 80061 LIPID PANEL: CPT

## 2021-11-17 PROCEDURE — 80053 COMPREHEN METABOLIC PANEL: CPT

## 2021-11-17 PROCEDURE — 85027 COMPLETE CBC AUTOMATED: CPT

## 2021-11-17 PROCEDURE — 84443 ASSAY THYROID STIM HORMONE: CPT

## 2021-12-07 NOTE — ASSESSMENT & PLAN NOTE
Lab Results   Component Value Date    A1C 5.5 12/09/2020     (H) 11/17/2021    HDL 39 (L) 11/17/2021    TRIG 185 (H) 11/17/2021      [x] Low HDL  [x] Elevated FBS  [x] Elevated SBP  [x] Elevated Trigs  [] Increased Waist Circ  Stable working on Time [employer]

## 2021-12-08 ENCOUNTER — OFFICE VISIT (OUTPATIENT)
Dept: FAMILY MEDICINE CLINIC | Facility: CLINIC | Age: 61
End: 2021-12-08

## 2021-12-08 VITALS
SYSTOLIC BLOOD PRESSURE: 122 MMHG | DIASTOLIC BLOOD PRESSURE: 80 MMHG | RESPIRATION RATE: 18 BRPM | WEIGHT: 244.38 LBS | HEIGHT: 73 IN | BODY MASS INDEX: 32.39 KG/M2 | HEART RATE: 76 BPM

## 2021-12-08 DIAGNOSIS — D69.6 THROMBOCYTOPENIA (HCC): ICD-10-CM

## 2021-12-08 DIAGNOSIS — I10 ESSENTIAL HYPERTENSION: ICD-10-CM

## 2021-12-08 DIAGNOSIS — I10 HYPERTENSION GOAL BP (BLOOD PRESSURE) < 140/90: ICD-10-CM

## 2021-12-08 DIAGNOSIS — E78.5 HYPERLIPIDEMIA WITH TARGET LDL LESS THAN 130: ICD-10-CM

## 2021-12-08 DIAGNOSIS — Z12.11 SCREEN FOR COLON CANCER: ICD-10-CM

## 2021-12-08 DIAGNOSIS — E78.2 MIXED HYPERLIPIDEMIA: ICD-10-CM

## 2021-12-08 DIAGNOSIS — E88.81 METABOLIC SYNDROME: ICD-10-CM

## 2021-12-08 DIAGNOSIS — Z00.00 ANNUAL PHYSICAL EXAM: Primary | ICD-10-CM

## 2021-12-08 PROCEDURE — 3074F SYST BP LT 130 MM HG: CPT | Performed by: FAMILY MEDICINE

## 2021-12-08 PROCEDURE — 90750 HZV VACC RECOMBINANT IM: CPT | Performed by: FAMILY MEDICINE

## 2021-12-08 PROCEDURE — 90686 IIV4 VACC NO PRSV 0.5 ML IM: CPT | Performed by: FAMILY MEDICINE

## 2021-12-08 PROCEDURE — 3008F BODY MASS INDEX DOCD: CPT | Performed by: FAMILY MEDICINE

## 2021-12-08 PROCEDURE — 90472 IMMUNIZATION ADMIN EACH ADD: CPT | Performed by: FAMILY MEDICINE

## 2021-12-08 PROCEDURE — 99396 PREV VISIT EST AGE 40-64: CPT | Performed by: FAMILY MEDICINE

## 2021-12-08 PROCEDURE — 90471 IMMUNIZATION ADMIN: CPT | Performed by: FAMILY MEDICINE

## 2021-12-08 PROCEDURE — 3079F DIAST BP 80-89 MM HG: CPT | Performed by: FAMILY MEDICINE

## 2021-12-08 RX ORDER — LOSARTAN POTASSIUM 25 MG/1
25 TABLET ORAL DAILY
Qty: 90 TABLET | Refills: 3 | Status: SHIPPED | OUTPATIENT
Start: 2021-12-08 | End: 2021-12-13

## 2021-12-08 RX ORDER — ATORVASTATIN CALCIUM 40 MG/1
40 TABLET, FILM COATED ORAL DAILY
Qty: 90 TABLET | Refills: 3 | Status: SHIPPED | OUTPATIENT
Start: 2021-12-08

## 2021-12-08 NOTE — PROGRESS NOTES
Jerome Myers is a 59year old male who presents for a complete physical exam.     had concerns including Physical (annual ). Annual Physical due on 12/23/2021      Subjective:    He complains of doing well. [facility]      He smoked tobacco in the past but quit g No results found for this or any previous visit.      Colonoscopy Never done   No recommendations at this time   Colonoscopy Never done  Zoster Vaccines(1 of 2) Never done  FIT/FOBT Colorectal Screening due on 12/04/2020  Influenza Vaccine(1) due on 10/01 Ht 6' 1\" (1.854 m)   Wt 244 lb 6.4 oz (110.9 kg)   BMI 32.24 kg/m²  Estimated body mass index is 32.24 kg/m² as calculated from the following:    Height as of this encounter: 6' 1\" (1.854 m). Weight as of this encounter: 244 lb 6.4 oz (110.9 kg).    Ph Affect: Mood normal.         Behavior: Behavior normal.         Thought Content: Thought content normal.          Assessment & Plan:     Marilin Kelly is a 59year old male who presents for a complete physical exam.   Pt's weight is Body mass index is 32.24 11/17/2021      [x] Low HDL  [x] Elevated FBS  [x] Elevated SBP  [x] Elevated Trigs  [] Increased Waist Circ  Stable working on weight loss and Therapuetic Lifestyle Change    6. Hypertension goal BP (blood pressure) < 140/90  -     Losartan Potassium;  Eulalia Kearney

## 2021-12-11 DIAGNOSIS — I10 HYPERTENSION GOAL BP (BLOOD PRESSURE) < 140/90: ICD-10-CM

## 2021-12-13 RX ORDER — LOSARTAN POTASSIUM 25 MG/1
TABLET ORAL
Qty: 90 TABLET | Refills: 0 | Status: SHIPPED | OUTPATIENT
Start: 2021-12-13

## 2021-12-13 NOTE — TELEPHONE ENCOUNTER
Requested Prescriptions     Pending Prescriptions Disp Refills   • LOSARTAN 25 MG Oral Tab [Pharmacy Med Name: Losartan Potassium 25 MG Oral Tablet] 90 tablet 0     Sig: Take 1 tablet by mouth once daily     LOV 12/8/2021     Patient was asked to follow-up

## 2022-02-16 ENCOUNTER — TELEPHONE (OUTPATIENT)
Dept: FAMILY MEDICINE CLINIC | Facility: CLINIC | Age: 62
End: 2022-02-16

## 2022-07-05 ENCOUNTER — PATIENT OUTREACH (OUTPATIENT)
Dept: FAMILY MEDICINE CLINIC | Facility: CLINIC | Age: 62
End: 2022-07-05

## 2022-07-05 DIAGNOSIS — Z12.11 COLON CANCER SCREENING: Primary | ICD-10-CM

## 2022-07-07 ENCOUNTER — TELEPHONE (OUTPATIENT)
Dept: FAMILY MEDICINE CLINIC | Facility: CLINIC | Age: 62
End: 2022-07-07

## 2022-07-07 DIAGNOSIS — R73.9 HYPERGLYCEMIA: ICD-10-CM

## 2022-07-07 DIAGNOSIS — Z12.5 SCREENING FOR PROSTATE CANCER: ICD-10-CM

## 2022-07-07 DIAGNOSIS — Z00.00 LABORATORY EXAMINATION ORDERED AS PART OF A ROUTINE GENERAL MEDICAL EXAMINATION: ICD-10-CM

## 2022-07-07 NOTE — TELEPHONE ENCOUNTER
Please enter lab orders for the patient's upcoming physical appointment. Physical scheduled: Your appointments     Date & Time Appointment Department [facility])    Dec 07, 2022  9:15 AM CST Physical - Established with Phan Baig MD [facility], [address], [place]  ([employer] [place])            [address] [place] [address] [address] [facility] [phone]         Preferred lab: [facility] LAB [facility] CANCER CTR & RESEARCH INST)     The patient has been notified to complete fasting labs prior to their physical appointment. 1. Laboratory examination ordered as part of a routine general medical examination  -     Comp Metabolic Panel (14); Future; Expected date: 10/03/2022  -     Lipid Panel; Future; Expected date: 10/03/2022  -     TSH W Reflex To Free T4; Future; Expected date: 10/03/2022  -     CBC, Platelet; No Differential; Future; Expected date: 10/03/2022  -     Hemoglobin A1C; Future; Expected date: 10/03/2022  2. Screening for prostate cancer  -     PSA Screen; Future; Expected date: 10/03/2022  3.  Hyperglycemia  -     Hemoglobin A1C; Future; Expected date: 10/03/2022   10/3/2022

## 2022-07-07 NOTE — TELEPHONE ENCOUNTER
Pt's spouse states that his Ensure One kit has an expiration date on 6/12/22.  Per triage do not use and  pt to  another kit

## 2022-10-30 NOTE — PROGRESS NOTES
Patient presents with:  Cough: cough is dry, fever, nose congestion, runny nose x 3 dys tried OTC        SUBJECTIVE:   Chel Rivera is a 59year old male who presents complaining of flu-like symptoms of fever to 102, malaise, fatigue, chills, myalgias, co --Skin:  No problems with hair or nails. No rash.   No new skin lesions.  --HEENT: As above and denies problems with eyes, ears, mouth, and neck  --Respiratory: As above and no wheezing, changes in voice or shortness of breath  --Cardiovascular:  No chest If no better in 2-3 days, or if symptoms worsen, follow-up for further evaluation. Influenza (Adult)    Influenza is also called the flu. It is a viral illness that affects the air passages of your lungs. It is different from the common cold.  Sienna Kay Follow up with your healthcare provider, or as advised, if you are not getting better over the next week. If you are age 72 or older, talk with your provider about getting a pneumococcal vaccine every 5 years.  You should also get this vaccine if you have Abdominal Pain, N/V/D

## 2022-11-16 ENCOUNTER — LAB ENCOUNTER (OUTPATIENT)
Dept: LAB | Age: 62
End: 2022-11-16
Attending: FAMILY MEDICINE
Payer: COMMERCIAL

## 2022-11-16 DIAGNOSIS — R73.9 HYPERGLYCEMIA: ICD-10-CM

## 2022-11-16 DIAGNOSIS — Z12.5 SCREENING FOR PROSTATE CANCER: ICD-10-CM

## 2022-11-16 DIAGNOSIS — Z00.00 LABORATORY EXAMINATION ORDERED AS PART OF A ROUTINE GENERAL MEDICAL EXAMINATION: ICD-10-CM

## 2022-11-16 LAB
ALBUMIN SERPL-MCNC: 3.9 G/DL (ref 3.4–5)
ALBUMIN/GLOB SERPL: 1.1 {RATIO} (ref 1–2)
ALP LIVER SERPL-CCNC: 72 U/L
ALT SERPL-CCNC: 46 U/L
ANION GAP SERPL CALC-SCNC: 2 MMOL/L (ref 0–18)
AST SERPL-CCNC: 29 U/L (ref 15–37)
BILIRUB SERPL-MCNC: 1.7 MG/DL (ref 0.1–2)
BUN BLD-MCNC: 15 MG/DL (ref 7–18)
CALCIUM BLD-MCNC: 9.4 MG/DL (ref 8.5–10.1)
CHLORIDE SERPL-SCNC: 106 MMOL/L (ref 98–112)
CHOLEST SERPL-MCNC: 154 MG/DL (ref ?–200)
CO2 SERPL-SCNC: 30 MMOL/L (ref 21–32)
COMPLEXED PSA SERPL-MCNC: 1.69 NG/ML (ref ?–4)
CREAT BLD-MCNC: 1.19 MG/DL
ERYTHROCYTE [DISTWIDTH] IN BLOOD BY AUTOMATED COUNT: 12.8 %
EST. AVERAGE GLUCOSE BLD GHB EST-MCNC: 123 MG/DL (ref 68–126)
FASTING PATIENT LIPID ANSWER: YES
FASTING STATUS PATIENT QL REPORTED: YES
GFR SERPLBLD BASED ON 1.73 SQ M-ARVRAT: 69 ML/MIN/1.73M2 (ref 60–?)
GLOBULIN PLAS-MCNC: 3.5 G/DL (ref 2.8–4.4)
GLUCOSE BLD-MCNC: 123 MG/DL (ref 70–99)
HBA1C MFR BLD: 5.9 % (ref ?–5.7)
HCT VFR BLD AUTO: 43.7 %
HDLC SERPL-MCNC: 44 MG/DL (ref 40–59)
HGB BLD-MCNC: 15.3 G/DL
LDLC SERPL CALC-MCNC: 74 MG/DL (ref ?–100)
MCH RBC QN AUTO: 30.2 PG (ref 26–34)
MCHC RBC AUTO-ENTMCNC: 35 G/DL (ref 31–37)
MCV RBC AUTO: 86.4 FL
NONHDLC SERPL-MCNC: 110 MG/DL (ref ?–130)
OSMOLALITY SERPL CALC.SUM OF ELEC: 288 MOSM/KG (ref 275–295)
PLATELET # BLD AUTO: 161 10(3)UL (ref 150–450)
POTASSIUM SERPL-SCNC: 4.1 MMOL/L (ref 3.5–5.1)
PROT SERPL-MCNC: 7.4 G/DL (ref 6.4–8.2)
RBC # BLD AUTO: 5.06 X10(6)UL
SODIUM SERPL-SCNC: 138 MMOL/L (ref 136–145)
TRIGL SERPL-MCNC: 219 MG/DL (ref 30–149)
TSI SER-ACNC: 2.42 MIU/ML (ref 0.36–3.74)
VLDLC SERPL CALC-MCNC: 34 MG/DL (ref 0–30)
WBC # BLD AUTO: 7.4 X10(3) UL (ref 4–11)

## 2022-11-16 PROCEDURE — 80061 LIPID PANEL: CPT

## 2022-11-16 PROCEDURE — 85027 COMPLETE CBC AUTOMATED: CPT

## 2022-11-16 PROCEDURE — 84443 ASSAY THYROID STIM HORMONE: CPT

## 2022-11-16 PROCEDURE — 83036 HEMOGLOBIN GLYCOSYLATED A1C: CPT

## 2022-11-16 PROCEDURE — 36415 COLL VENOUS BLD VENIPUNCTURE: CPT

## 2022-11-16 PROCEDURE — 80053 COMPREHEN METABOLIC PANEL: CPT

## 2022-12-06 NOTE — ASSESSMENT & PLAN NOTE
As for his Pre-Diabetes, it is well controlled, no significant medication side effects noted. Recommendations are: continue present meds, lose weight by increased dietary compliance and exercise and will check labs as ordered.     Lab Results   Component Value Date    A1C 5.9 (H) 11/16/2022    A1C 5.5 12/09/2020

## 2022-12-07 ENCOUNTER — OFFICE VISIT (OUTPATIENT)
Dept: FAMILY MEDICINE CLINIC | Facility: CLINIC | Age: 62
End: 2022-12-07
Payer: COMMERCIAL

## 2022-12-07 VITALS
HEIGHT: 73 IN | BODY MASS INDEX: 33.22 KG/M2 | SYSTOLIC BLOOD PRESSURE: 120 MMHG | WEIGHT: 250.63 LBS | HEART RATE: 68 BPM | RESPIRATION RATE: 18 BRPM | DIASTOLIC BLOOD PRESSURE: 62 MMHG

## 2022-12-07 DIAGNOSIS — E66.9 OBESITY (BMI 30.0-34.9): ICD-10-CM

## 2022-12-07 DIAGNOSIS — R73.03 PREDIABETES: Chronic | ICD-10-CM

## 2022-12-07 DIAGNOSIS — E88.81 METABOLIC SYNDROME: ICD-10-CM

## 2022-12-07 DIAGNOSIS — Z00.00 ANNUAL PHYSICAL EXAM: Primary | ICD-10-CM

## 2022-12-07 DIAGNOSIS — Z80.42 FAMILY HISTORY OF PROSTATE CANCER IN FATHER: ICD-10-CM

## 2022-12-07 DIAGNOSIS — D69.6 THROMBOCYTOPENIA (HCC): ICD-10-CM

## 2022-12-07 DIAGNOSIS — I10 ESSENTIAL HYPERTENSION: ICD-10-CM

## 2022-12-07 DIAGNOSIS — E78.2 MIXED HYPERLIPIDEMIA: ICD-10-CM

## 2022-12-07 PROCEDURE — 90750 HZV VACC RECOMBINANT IM: CPT | Performed by: FAMILY MEDICINE

## 2022-12-07 PROCEDURE — 3078F DIAST BP <80 MM HG: CPT | Performed by: FAMILY MEDICINE

## 2022-12-07 PROCEDURE — 90472 IMMUNIZATION ADMIN EACH ADD: CPT | Performed by: FAMILY MEDICINE

## 2022-12-07 PROCEDURE — 3008F BODY MASS INDEX DOCD: CPT | Performed by: FAMILY MEDICINE

## 2022-12-07 PROCEDURE — 90686 IIV4 VACC NO PRSV 0.5 ML IM: CPT | Performed by: FAMILY MEDICINE

## 2022-12-07 PROCEDURE — 99396 PREV VISIT EST AGE 40-64: CPT | Performed by: FAMILY MEDICINE

## 2022-12-07 PROCEDURE — 3074F SYST BP LT 130 MM HG: CPT | Performed by: FAMILY MEDICINE

## 2022-12-07 PROCEDURE — 90471 IMMUNIZATION ADMIN: CPT | Performed by: FAMILY MEDICINE

## 2022-12-07 RX ORDER — ATORVASTATIN CALCIUM 40 MG/1
40 TABLET, FILM COATED ORAL DAILY
Qty: 90 TABLET | Refills: 3 | Status: SHIPPED | OUTPATIENT
Start: 2022-12-07

## 2022-12-07 RX ORDER — LOSARTAN POTASSIUM 25 MG/1
25 TABLET ORAL DAILY
Qty: 90 TABLET | Refills: 3 | Status: SHIPPED | OUTPATIENT
Start: 2022-12-07

## 2023-06-01 ENCOUNTER — TELEPHONE (OUTPATIENT)
Dept: FAMILY MEDICINE CLINIC | Facility: CLINIC | Age: 63
End: 2023-06-01

## 2023-06-01 NOTE — TELEPHONE ENCOUNTER
Please enter lab orders for the patient's upcoming physical appointment. Physical scheduled: Your appointments     Date & Time Appointment Department [facility])    Dec 13, 2023  9:00 AM CST Physical - Established with Shay Sanches MD [address], [address], [place] ([address])            [address], [address], [address] [facility] [address] [place] [address] [phone]         Preferred lab: [facility] CANCER CTR & RESEARCH INST)     The patient has been notified to complete fasting labs prior to their physical appointment.

## 2023-06-29 ENCOUNTER — TELEPHONE (OUTPATIENT)
Dept: FAMILY MEDICINE CLINIC | Facility: CLINIC | Age: 63
End: 2023-06-29

## 2023-06-29 DIAGNOSIS — Z12.11 COLON CANCER SCREENING: Primary | ICD-10-CM

## 2023-10-10 ENCOUNTER — TELEPHONE (OUTPATIENT)
Dept: FAMILY MEDICINE CLINIC | Facility: CLINIC | Age: 63
End: 2023-10-10

## 2023-10-10 NOTE — TELEPHONE ENCOUNTER
Please enter lab orders for the patient's upcoming physical appointment. Physical scheduled: Your appointments       Date & Time Appointment Department [facility])    Dec 20, 2023  9:30 AM CST Physical - Established with Chucky Agarwal MD [address], [address], [place] ([address]              [place] [address] [address] [phone]           [facility] CANCER CTR & RESEARCH INST)     The patient has been notified to complete fasting labs prior to their physical appointment.

## 2023-10-10 NOTE — TELEPHONE ENCOUNTER
Please enter lab orders for the patient's upcoming physical appointment.     Physical scheduled:   Your appointments       Date & Time Appointment Department ([place])    Dec 20, 2023 11:30 AM CST Physical - Established with Ze Rooney MD [facility] ([facility])              [facility]  [address]  [facility] [address]  [phone]           Preferred lab: [facility] LAB ([facility])     The patient has been notified to complete fasting labs prior to their physical appointment.

## 2023-11-27 ENCOUNTER — TELEPHONE (OUTPATIENT)
Dept: FAMILY MEDICINE CLINIC | Facility: CLINIC | Age: 63
End: 2023-11-27

## 2023-11-27 DIAGNOSIS — Z23 IMMUNIZATION DUE: Primary | ICD-10-CM

## 2023-11-27 NOTE — TELEPHONE ENCOUNTER
LOV 12/7/2022   Schedule for Tdap on11/30/2023 EMG 03 NURSE  Annual PE with you on 12/20/23  Last Tdap 04/26/2012    Immunization pended for your review    Routed to Dr. Sade Verma

## 2023-11-30 ENCOUNTER — NURSE ONLY (OUTPATIENT)
Dept: FAMILY MEDICINE CLINIC | Facility: CLINIC | Age: 63
End: 2023-11-30
Payer: COMMERCIAL

## 2023-11-30 DIAGNOSIS — Z23 NEEDS FLU SHOT: Primary | ICD-10-CM

## 2023-11-30 DIAGNOSIS — Z23 NEED FOR VACCINATION: ICD-10-CM

## 2023-11-30 PROCEDURE — 90715 TDAP VACCINE 7 YRS/> IM: CPT | Performed by: FAMILY MEDICINE

## 2023-11-30 PROCEDURE — 90472 IMMUNIZATION ADMIN EACH ADD: CPT | Performed by: FAMILY MEDICINE

## 2023-11-30 PROCEDURE — 90686 IIV4 VACC NO PRSV 0.5 ML IM: CPT | Performed by: FAMILY MEDICINE

## 2023-11-30 PROCEDURE — 90471 IMMUNIZATION ADMIN: CPT | Performed by: FAMILY MEDICINE

## 2023-12-03 DIAGNOSIS — I10 ESSENTIAL HYPERTENSION: ICD-10-CM

## 2023-12-07 DIAGNOSIS — I10 ESSENTIAL HYPERTENSION: ICD-10-CM

## 2023-12-07 RX ORDER — LOSARTAN POTASSIUM 25 MG/1
25 TABLET ORAL DAILY
Qty: 90 TABLET | Refills: 0 | Status: SHIPPED | OUTPATIENT
Start: 2023-12-07 | End: 2023-12-11

## 2023-12-07 NOTE — TELEPHONE ENCOUNTER
Requested Prescriptions     Pending Prescriptions Disp Refills    LOSARTAN 25 MG Oral Tab [Pharmacy Med Name: Losartan Potassium 25 MG Oral Tablet] 90 tablet 0     Sig: Take 1 tablet by mouth once daily     LOV 12/7/2022     Patient was asked to follow-up in: 6 months    Appointment scheduled: 12/20/2023 Nieves Emery MD     Medication refilled per protocol.

## 2023-12-11 RX ORDER — LOSARTAN POTASSIUM 25 MG/1
25 TABLET ORAL DAILY
Qty: 90 TABLET | Refills: 0 | Status: SHIPPED | OUTPATIENT
Start: 2023-12-11 | End: 2023-12-20

## 2023-12-11 NOTE — TELEPHONE ENCOUNTER
Requested Prescriptions     Pending Prescriptions Disp Refills    LOSARTAN 25 MG Oral Tab [Pharmacy Med Name: Losartan Potassium 25 MG Oral Tablet] 90 tablet 0     Sig: Take 1 tablet by mouth once daily     LOV 12/7/2022     Patient was asked to follow-up in: 6 months    Appointment scheduled: 12/20/2023 Ze Rooney MD     Medication failed protocol.    Routed to front staff     Patient is due for their annual physical please call patient and help them schedule an appointment

## 2023-12-13 ENCOUNTER — LAB ENCOUNTER (OUTPATIENT)
Dept: LAB | Age: 63
End: 2023-12-13
Attending: FAMILY MEDICINE
Payer: COMMERCIAL

## 2023-12-13 DIAGNOSIS — E55.9 VITAMIN D DEFICIENCY: ICD-10-CM

## 2023-12-13 DIAGNOSIS — I10 ESSENTIAL HYPERTENSION: ICD-10-CM

## 2023-12-13 DIAGNOSIS — Z12.5 SCREENING FOR PROSTATE CANCER: ICD-10-CM

## 2023-12-13 DIAGNOSIS — R73.9 HYPERGLYCEMIA: ICD-10-CM

## 2023-12-13 DIAGNOSIS — Z13.1 SCREENING FOR DIABETES MELLITUS: ICD-10-CM

## 2023-12-13 DIAGNOSIS — Z00.00 LABORATORY EXAMINATION ORDERED AS PART OF A ROUTINE GENERAL MEDICAL EXAMINATION: ICD-10-CM

## 2023-12-13 DIAGNOSIS — E78.5 DYSLIPIDEMIA: ICD-10-CM

## 2023-12-13 DIAGNOSIS — Z13.29 SCREENING FOR THYROID DISORDER: ICD-10-CM

## 2023-12-13 DIAGNOSIS — Z13.6 SCREENING FOR CARDIOVASCULAR CONDITION: ICD-10-CM

## 2023-12-13 DIAGNOSIS — Z13.0 SCREENING FOR IRON DEFICIENCY ANEMIA: ICD-10-CM

## 2023-12-13 LAB
ALBUMIN SERPL-MCNC: 4 G/DL (ref 3.4–5)
ALBUMIN/GLOB SERPL: 1.2 {RATIO} (ref 1–2)
ALP LIVER SERPL-CCNC: 68 U/L
ALT SERPL-CCNC: 47 U/L
ANION GAP SERPL CALC-SCNC: 5 MMOL/L (ref 0–18)
AST SERPL-CCNC: 43 U/L (ref 15–37)
BASOPHILS # BLD AUTO: 0.07 X10(3) UL (ref 0–0.2)
BASOPHILS NFR BLD AUTO: 1 %
BILIRUB SERPL-MCNC: 1.9 MG/DL (ref 0.1–2)
BUN BLD-MCNC: 15 MG/DL (ref 9–23)
CALCIUM BLD-MCNC: 9.1 MG/DL (ref 8.5–10.1)
CHLORIDE SERPL-SCNC: 107 MMOL/L (ref 98–112)
CHOLEST SERPL-MCNC: 146 MG/DL (ref ?–200)
CO2 SERPL-SCNC: 28 MMOL/L (ref 21–32)
COMPLEXED PSA SERPL-MCNC: 1.59 NG/ML (ref ?–4)
CREAT BLD-MCNC: 1.79 MG/DL
EGFRCR SERPLBLD CKD-EPI 2021: 42 ML/MIN/1.73M2 (ref 60–?)
EOSINOPHIL # BLD AUTO: 0.39 X10(3) UL (ref 0–0.7)
EOSINOPHIL NFR BLD AUTO: 5.3 %
ERYTHROCYTE [DISTWIDTH] IN BLOOD BY AUTOMATED COUNT: 12.9 %
EST. AVERAGE GLUCOSE BLD GHB EST-MCNC: 131 MG/DL (ref 68–126)
FASTING PATIENT LIPID ANSWER: YES
FASTING STATUS PATIENT QL REPORTED: YES
GLOBULIN PLAS-MCNC: 3.3 G/DL (ref 2.8–4.4)
GLUCOSE BLD-MCNC: 127 MG/DL (ref 70–99)
HBA1C MFR BLD: 6.2 % (ref ?–5.7)
HCT VFR BLD AUTO: 45.4 %
HDLC SERPL-MCNC: 41 MG/DL (ref 40–59)
HGB BLD-MCNC: 15.6 G/DL
IMM GRANULOCYTES # BLD AUTO: 0.02 X10(3) UL (ref 0–1)
IMM GRANULOCYTES NFR BLD: 0.3 %
LDLC SERPL CALC-MCNC: 72 MG/DL (ref ?–100)
LYMPHOCYTES # BLD AUTO: 2.34 X10(3) UL (ref 1–4)
LYMPHOCYTES NFR BLD AUTO: 31.8 %
MCH RBC QN AUTO: 29.9 PG (ref 26–34)
MCHC RBC AUTO-ENTMCNC: 34.4 G/DL (ref 31–37)
MCV RBC AUTO: 87 FL
MONOCYTES # BLD AUTO: 0.65 X10(3) UL (ref 0.1–1)
MONOCYTES NFR BLD AUTO: 8.8 %
NEUTROPHILS # BLD AUTO: 3.89 X10 (3) UL (ref 1.5–7.7)
NEUTROPHILS # BLD AUTO: 3.89 X10(3) UL (ref 1.5–7.7)
NEUTROPHILS NFR BLD AUTO: 52.8 %
NONHDLC SERPL-MCNC: 105 MG/DL (ref ?–130)
OSMOLALITY SERPL CALC.SUM OF ELEC: 292 MOSM/KG (ref 275–295)
PLATELET # BLD AUTO: 108 10(3)UL (ref 150–450)
POTASSIUM SERPL-SCNC: 4.6 MMOL/L (ref 3.5–5.1)
PROT SERPL-MCNC: 7.3 G/DL (ref 6.4–8.2)
RBC # BLD AUTO: 5.22 X10(6)UL
SODIUM SERPL-SCNC: 140 MMOL/L (ref 136–145)
TRIGL SERPL-MCNC: 199 MG/DL (ref 30–149)
TSI SER-ACNC: 1.82 MIU/ML (ref 0.36–3.74)
VIT D+METAB SERPL-MCNC: 29.3 NG/ML (ref 30–100)
VLDLC SERPL CALC-MCNC: 31 MG/DL (ref 0–30)
WBC # BLD AUTO: 7.4 X10(3) UL (ref 4–11)

## 2023-12-13 PROCEDURE — 80061 LIPID PANEL: CPT

## 2023-12-13 PROCEDURE — 36415 COLL VENOUS BLD VENIPUNCTURE: CPT

## 2023-12-13 PROCEDURE — 80053 COMPREHEN METABOLIC PANEL: CPT

## 2023-12-13 PROCEDURE — 85025 COMPLETE CBC W/AUTO DIFF WBC: CPT

## 2023-12-13 PROCEDURE — 84443 ASSAY THYROID STIM HORMONE: CPT

## 2023-12-13 PROCEDURE — 82306 VITAMIN D 25 HYDROXY: CPT

## 2023-12-13 PROCEDURE — 83036 HEMOGLOBIN GLYCOSYLATED A1C: CPT

## 2023-12-19 NOTE — ASSESSMENT & PLAN NOTE
12/13/2023: HgbA1C 6.2 (H);  Glucose 127 (H); HDL Cholesterol 41; Triglycerides 199 (H)    Elevated FBS, Elevated SBP, Elevated Trigs, Increased Waist Circ, and Stable, continue present management stable, continue present management

## 2023-12-19 NOTE — ASSESSMENT & PLAN NOTE
Last Platelet value was 912 done 12/13/2023. Lowest level in the last 10 years was 103.  Stable, continue present management

## 2023-12-19 NOTE — ASSESSMENT & PLAN NOTE
Cholesterol shows Good control. Long term heart-healthy diet and lifestyle discussed and encouraged to reduce risk of cardiovascular disease. Cholesterol: 146, done on 12/13/2023. HDL Cholesterol: 41, done on 12/13/2023. TriGlycerides 199, done on 12/13/2023. LDL Cholesterol: 72, done on 12/13/2023. Cholesterol medications include atorvastatin 40 MG Oral Tab [[id number]].

## 2023-12-19 NOTE — ASSESSMENT & PLAN NOTE
BP shows good control with last BP of 120/62. Continue lifestyle changes, diet, exercise and weight loss. Last K was 4.6 done on 12/13/2023. Last Cr was 1.79 done on 12/13/2023. Last eGFR was 42 on 12/13/2023.   BP Meds: losartan Tabs - 25 MG

## 2023-12-19 NOTE — ASSESSMENT & PLAN NOTE
Last A1c value was 6.2% done 12/13/2023. Sugar control is well controlled, no significant medication side effects noted, Pre-Diabetic. Not currently on Diabetic meds.

## 2023-12-20 ENCOUNTER — OFFICE VISIT (OUTPATIENT)
Dept: FAMILY MEDICINE CLINIC | Facility: CLINIC | Age: 63
End: 2023-12-20
Payer: COMMERCIAL

## 2023-12-20 VITALS
DIASTOLIC BLOOD PRESSURE: 84 MMHG | BODY MASS INDEX: 33.66 KG/M2 | RESPIRATION RATE: 14 BRPM | WEIGHT: 254 LBS | HEIGHT: 73 IN | SYSTOLIC BLOOD PRESSURE: 118 MMHG | HEART RATE: 70 BPM

## 2023-12-20 DIAGNOSIS — E78.2 MIXED HYPERLIPIDEMIA: ICD-10-CM

## 2023-12-20 DIAGNOSIS — Z00.00 ANNUAL PHYSICAL EXAM: Primary | ICD-10-CM

## 2023-12-20 DIAGNOSIS — R73.03 PREDIABETES: Chronic | ICD-10-CM

## 2023-12-20 DIAGNOSIS — D69.6 THROMBOCYTOPENIA (HCC): ICD-10-CM

## 2023-12-20 DIAGNOSIS — E88.810 METABOLIC SYNDROME: ICD-10-CM

## 2023-12-20 DIAGNOSIS — I10 ESSENTIAL HYPERTENSION: ICD-10-CM

## 2023-12-20 RX ORDER — ATORVASTATIN CALCIUM 40 MG/1
40 TABLET, FILM COATED ORAL DAILY
Qty: 90 TABLET | Refills: 3 | Status: SHIPPED | OUTPATIENT
Start: 2023-12-20

## 2023-12-20 RX ORDER — LOSARTAN POTASSIUM 25 MG/1
25 TABLET ORAL DAILY
Qty: 90 TABLET | Refills: 0 | Status: SHIPPED | OUTPATIENT
Start: 2023-12-20

## 2024-01-10 ENCOUNTER — IMMUNIZATION (OUTPATIENT)
Dept: LAB | Age: 64
End: 2024-01-10
Attending: EMERGENCY MEDICINE
Payer: COMMERCIAL

## 2024-01-10 DIAGNOSIS — Z23 NEED FOR VACCINATION: Primary | ICD-10-CM

## 2024-01-10 PROCEDURE — 90480 ADMN SARSCOV2 VAC 1/ONLY CMP: CPT

## 2024-08-24 DIAGNOSIS — I10 ESSENTIAL HYPERTENSION: ICD-10-CM

## 2024-08-26 DIAGNOSIS — I10 ESSENTIAL HYPERTENSION: ICD-10-CM

## 2024-08-26 RX ORDER — LOSARTAN POTASSIUM 25 MG/1
25 TABLET ORAL DAILY
Qty: 90 TABLET | Refills: 3 | Status: SHIPPED
Start: 2024-08-26

## 2024-08-26 RX ORDER — LOSARTAN POTASSIUM 25 MG/1
25 TABLET ORAL DAILY
Qty: 90 TABLET | Refills: 3 | Status: SHIPPED | OUTPATIENT
Start: 2024-08-26 | End: 2024-08-26

## 2024-08-26 NOTE — TELEPHONE ENCOUNTER
Requested Prescriptions     Pending Prescriptions Disp Refills    losartan 25 MG Oral Tab 90 tablet 0     Sig: Take 1 tablet (25 mg total) by mouth daily.     LOV 12/20/2023     Patient was asked to follow-up in: Follow-up not documented in note    Appointment scheduled: 12/11/2024 Ze Rooney MD     Medication failed protocol.    Routed to Dr. Toribio MD

## 2024-08-26 NOTE — TELEPHONE ENCOUNTER
Requested Prescriptions     Pending Prescriptions Disp Refills    LOSARTAN 25 MG Oral Tab [Pharmacy Med Name: Losartan Potassium 25 MG Oral Tablet] 90 tablet 0     Sig: Take 1 tablet by mouth once daily     LOV 12/20/2023     Patient was asked to follow-up in: Follow-up not documented in note    Appointment scheduled: 12/11/2024 Ze Rooney MD     Medication failed protocol.    Routed to Dr. Toribio MD

## 2024-10-28 ENCOUNTER — IMMUNIZATION (OUTPATIENT)
Dept: LAB | Age: 64
End: 2024-10-28
Attending: EMERGENCY MEDICINE
Payer: COMMERCIAL

## 2024-10-28 DIAGNOSIS — Z23 NEED FOR VACCINATION: Primary | ICD-10-CM

## 2024-10-28 PROCEDURE — 90471 IMMUNIZATION ADMIN: CPT

## 2024-10-28 PROCEDURE — 90662 IIV NO PRSV INCREASED AG IM: CPT

## 2024-10-28 PROCEDURE — 90480 ADMN SARSCOV2 VAC 1/ONLY CMP: CPT

## 2024-11-07 ENCOUNTER — TELEPHONE (OUTPATIENT)
Dept: FAMILY MEDICINE CLINIC | Facility: CLINIC | Age: 64
End: 2024-11-07

## 2024-11-07 DIAGNOSIS — N18.32 STAGE 3B CHRONIC KIDNEY DISEASE (CKD) (HCC): ICD-10-CM

## 2024-11-07 DIAGNOSIS — Z13.6 SCREENING FOR CARDIOVASCULAR CONDITION: ICD-10-CM

## 2024-11-07 DIAGNOSIS — I10 ESSENTIAL HYPERTENSION: ICD-10-CM

## 2024-11-07 DIAGNOSIS — Z12.5 SCREENING FOR PROSTATE CANCER: ICD-10-CM

## 2024-11-07 DIAGNOSIS — Z13.29 SCREENING FOR THYROID DISORDER: ICD-10-CM

## 2024-11-07 DIAGNOSIS — R73.9 HYPERGLYCEMIA: ICD-10-CM

## 2024-11-07 DIAGNOSIS — Z13.1 SCREENING FOR DIABETES MELLITUS: Primary | ICD-10-CM

## 2024-11-07 DIAGNOSIS — Z13.0 SCREENING FOR IRON DEFICIENCY ANEMIA: ICD-10-CM

## 2024-11-07 DIAGNOSIS — E55.9 VITAMIN D DEFICIENCY: ICD-10-CM

## 2024-11-07 DIAGNOSIS — E78.5 DYSLIPIDEMIA: ICD-10-CM

## 2024-11-07 NOTE — TELEPHONE ENCOUNTER
1. Screening for diabetes mellitus (Primary)  -     Comp Metabolic Panel (14); Future; Expected date: 11/07/2024  -     Hemoglobin A1C; Future; Expected date: 11/07/2024  2. Screening for iron deficiency anemia  -     CBC With Differential With Platelet; Future; Expected date: 11/07/2024  3. Screening for thyroid disorder  -     TSH W Reflex To Free T4; Future; Expected date: 11/07/2024  4. Screening for cardiovascular condition  -     Lipid Panel; Future; Expected date: 11/07/2024  5. Screening for prostate cancer  -     PSA Total, Screen; Future; Expected date: 11/07/2024  6. Hyperglycemia  7. Dyslipidemia  -     TSH W Reflex To Free T4; Future; Expected date: 11/07/2024  -     Lipid Panel; Future; Expected date: 11/07/2024  8. Stage 3b chronic kidney disease (CKD) (HCC)  -     CBC With Differential With Platelet; Future; Expected date: 11/07/2024  -     Comp Metabolic Panel (14); Future; Expected date: 11/07/2024  9. Essential hypertension  Overview:  Losartan 25  Orders:  -     TSH W Reflex To Free T4; Future; Expected date: 11/07/2024  -     CBC With Differential With Platelet; Future; Expected date: 11/07/2024  -     Comp Metabolic Panel (14); Future; Expected date: 11/07/2024  10. Vitamin D deficiency  -     Vitamin D, 25-Hydroxy; Future; Expected date: 11/07/2024       OK to notify. Thanks, Tripp Rooney MD

## 2024-11-07 NOTE — TELEPHONE ENCOUNTER
Please enter lab orders for the patient's upcoming physical appointment.     Physical scheduled:   Your appointments       Date & Time Appointment Department ([place])    Dec 11, 2024 11:30 AM CST Physical - Established with Ze Rooney MD [facility] ([facility])              [facility]  [address]  [facility] [address]  [phone]           Preferred lab: [facility] ([facility])     The patient has been notified to complete fasting labs prior to their physical appointment.

## 2024-11-25 ENCOUNTER — LAB ENCOUNTER (OUTPATIENT)
Dept: LAB | Age: 64
End: 2024-11-25
Attending: FAMILY MEDICINE
Payer: MEDICARE

## 2024-11-25 DIAGNOSIS — Z13.1 SCREENING FOR DIABETES MELLITUS: ICD-10-CM

## 2024-11-25 DIAGNOSIS — N18.32 STAGE 3B CHRONIC KIDNEY DISEASE (CKD) (HCC): ICD-10-CM

## 2024-11-25 DIAGNOSIS — E78.5 DYSLIPIDEMIA: ICD-10-CM

## 2024-11-25 DIAGNOSIS — E55.9 VITAMIN D DEFICIENCY: ICD-10-CM

## 2024-11-25 DIAGNOSIS — Z13.6 SCREENING FOR CARDIOVASCULAR CONDITION: ICD-10-CM

## 2024-11-25 DIAGNOSIS — Z13.0 SCREENING FOR IRON DEFICIENCY ANEMIA: ICD-10-CM

## 2024-11-25 DIAGNOSIS — Z12.5 SCREENING FOR PROSTATE CANCER: ICD-10-CM

## 2024-11-25 DIAGNOSIS — Z13.29 SCREENING FOR THYROID DISORDER: ICD-10-CM

## 2024-11-25 DIAGNOSIS — I10 ESSENTIAL HYPERTENSION: ICD-10-CM

## 2024-11-25 LAB
ALBUMIN SERPL-MCNC: 4.5 G/DL (ref 3.2–4.8)
ALBUMIN/GLOB SERPL: 1.7 {RATIO} (ref 1–2)
ALP LIVER SERPL-CCNC: 69 U/L
ALT SERPL-CCNC: 34 U/L
ANION GAP SERPL CALC-SCNC: 7 MMOL/L (ref 0–18)
AST SERPL-CCNC: 24 U/L (ref ?–34)
BASOPHILS # BLD AUTO: 0.1 X10(3) UL (ref 0–0.2)
BASOPHILS NFR BLD AUTO: 1.2 %
BILIRUB SERPL-MCNC: 1.4 MG/DL (ref 0.2–1.1)
BUN BLD-MCNC: 15 MG/DL (ref 9–23)
CALCIUM BLD-MCNC: 9.6 MG/DL (ref 8.7–10.4)
CHLORIDE SERPL-SCNC: 108 MMOL/L (ref 98–112)
CHOLEST SERPL-MCNC: 148 MG/DL (ref ?–200)
CO2 SERPL-SCNC: 24 MMOL/L (ref 21–32)
COMPLEXED PSA SERPL-MCNC: 1.48 NG/ML (ref ?–4)
CREAT BLD-MCNC: 1.11 MG/DL
EGFRCR SERPLBLD CKD-EPI 2021: 74 ML/MIN/1.73M2 (ref 60–?)
EOSINOPHIL # BLD AUTO: 0.51 X10(3) UL (ref 0–0.7)
EOSINOPHIL NFR BLD AUTO: 6.1 %
ERYTHROCYTE [DISTWIDTH] IN BLOOD BY AUTOMATED COUNT: 13.2 %
EST. AVERAGE GLUCOSE BLD GHB EST-MCNC: 126 MG/DL (ref 68–126)
FASTING PATIENT LIPID ANSWER: YES
FASTING STATUS PATIENT QL REPORTED: YES
GLOBULIN PLAS-MCNC: 2.7 G/DL (ref 2–3.5)
GLUCOSE BLD-MCNC: 115 MG/DL (ref 70–99)
HBA1C MFR BLD: 6 % (ref ?–5.7)
HCT VFR BLD AUTO: 44.4 %
HDLC SERPL-MCNC: 43 MG/DL (ref 40–59)
HGB BLD-MCNC: 15.6 G/DL
IMM GRANULOCYTES # BLD AUTO: 0.03 X10(3) UL (ref 0–1)
IMM GRANULOCYTES NFR BLD: 0.4 %
LDLC SERPL CALC-MCNC: 80 MG/DL (ref ?–100)
LYMPHOCYTES # BLD AUTO: 2.93 X10(3) UL (ref 1–4)
LYMPHOCYTES NFR BLD AUTO: 34.8 %
MCH RBC QN AUTO: 30.6 PG (ref 26–34)
MCHC RBC AUTO-ENTMCNC: 35.1 G/DL (ref 31–37)
MCV RBC AUTO: 87.2 FL
MONOCYTES # BLD AUTO: 0.67 X10(3) UL (ref 0.1–1)
MONOCYTES NFR BLD AUTO: 8 %
NEUTROPHILS # BLD AUTO: 4.17 X10 (3) UL (ref 1.5–7.7)
NEUTROPHILS # BLD AUTO: 4.17 X10(3) UL (ref 1.5–7.7)
NEUTROPHILS NFR BLD AUTO: 49.5 %
NONHDLC SERPL-MCNC: 105 MG/DL (ref ?–130)
OSMOLALITY SERPL CALC.SUM OF ELEC: 290 MOSM/KG (ref 275–295)
PLATELET # BLD AUTO: 145 10(3)UL (ref 150–450)
POTASSIUM SERPL-SCNC: 4.2 MMOL/L (ref 3.5–5.1)
PROT SERPL-MCNC: 7.2 G/DL (ref 5.7–8.2)
RBC # BLD AUTO: 5.09 X10(6)UL
SODIUM SERPL-SCNC: 139 MMOL/L (ref 136–145)
TRIGL SERPL-MCNC: 144 MG/DL (ref 30–149)
TSI SER-ACNC: 2.04 UIU/ML (ref 0.55–4.78)
VIT D+METAB SERPL-MCNC: 33.5 NG/ML (ref 30–100)
VLDLC SERPL CALC-MCNC: 23 MG/DL (ref 0–30)
WBC # BLD AUTO: 8.4 X10(3) UL (ref 4–11)

## 2024-11-25 PROCEDURE — 85025 COMPLETE CBC W/AUTO DIFF WBC: CPT

## 2024-11-25 PROCEDURE — 83036 HEMOGLOBIN GLYCOSYLATED A1C: CPT

## 2024-11-25 PROCEDURE — 84443 ASSAY THYROID STIM HORMONE: CPT

## 2024-11-25 PROCEDURE — 82306 VITAMIN D 25 HYDROXY: CPT

## 2024-11-25 PROCEDURE — 80053 COMPREHEN METABOLIC PANEL: CPT

## 2024-11-25 PROCEDURE — 80061 LIPID PANEL: CPT

## 2024-11-25 PROCEDURE — 36415 COLL VENOUS BLD VENIPUNCTURE: CPT

## 2024-12-10 NOTE — ASSESSMENT & PLAN NOTE
Cholesterol shows Good control. Long term heart-healthy diet and lifestyle discussed and encouraged to reduce risk of cardiovascular disease.  11/25/2024: Cholesterol, Total 148; HDL Cholesterol 43; Triglycerides 144; LDL Cholesterol 80  Cholesterol Meds: atorvastatin Tabs - 40 MG  stable  Continue with current treatment plan

## 2024-12-10 NOTE — ASSESSMENT & PLAN NOTE
Last BMI: 33.51, Class 1 Obesity.  He was counseled on diet and exercise for elevated BMI which is affecting his hypertension and hyperlipidemia.

## 2024-12-10 NOTE — ASSESSMENT & PLAN NOTE
Stable, continue present management and continue to monitor for progression 11/25/2024: Prostate Specific Antigen Screen  1.48

## 2024-12-10 NOTE — ASSESSMENT & PLAN NOTE
Last Platelet value was 145 done 11/25/2024. Lowest level in the last 10 years was 103. Stable, continue present management and continue to monitor for progression

## 2024-12-10 NOTE — ASSESSMENT & PLAN NOTE
11/25/2024: HgbA1C 6.0 (H); Glucose 115 (H) Sugar control is stable  Continue with current treatment plan  Pre-Diabetic. Not currently on Diabetic meds.

## 2024-12-10 NOTE — ASSESSMENT & PLAN NOTE
BP shows borderline control with last BP of 118/84. Work on lifestyle changes, diet, exercise and weight management.   11/25/2024: Potassium 4.2; Creatinine 1.11; eGFR-Cr 74  BP Meds: losartan Tabs - 25 MG   ACE/ARB Adherence Excellent at 99%

## 2024-12-11 ENCOUNTER — OFFICE VISIT (OUTPATIENT)
Dept: FAMILY MEDICINE CLINIC | Facility: CLINIC | Age: 64
End: 2024-12-11
Payer: MEDICARE

## 2024-12-11 VITALS
HEIGHT: 73 IN | DIASTOLIC BLOOD PRESSURE: 88 MMHG | SYSTOLIC BLOOD PRESSURE: 136 MMHG | WEIGHT: 250 LBS | HEART RATE: 78 BPM | BODY MASS INDEX: 33.13 KG/M2 | RESPIRATION RATE: 14 BRPM

## 2024-12-11 DIAGNOSIS — Z00.00 ANNUAL PHYSICAL EXAM: Primary | ICD-10-CM

## 2024-12-11 DIAGNOSIS — R73.03 PREDIABETES: Chronic | ICD-10-CM

## 2024-12-11 DIAGNOSIS — E78.2 MIXED HYPERLIPIDEMIA: ICD-10-CM

## 2024-12-11 DIAGNOSIS — D69.6 THROMBOCYTOPENIA (HCC): ICD-10-CM

## 2024-12-11 DIAGNOSIS — Z00.00 ENCOUNTER FOR ANNUAL HEALTH EXAMINATION: ICD-10-CM

## 2024-12-11 DIAGNOSIS — Z80.42 FAMILY HISTORY OF PROSTATE CANCER IN FATHER: ICD-10-CM

## 2024-12-11 DIAGNOSIS — Z12.11 SCREENING FOR COLON CANCER: ICD-10-CM

## 2024-12-11 DIAGNOSIS — E66.811 OBESITY (BMI 30.0-34.9): ICD-10-CM

## 2024-12-11 DIAGNOSIS — I10 ESSENTIAL HYPERTENSION: ICD-10-CM

## 2024-12-11 LAB
ATRIAL RATE: 68 BPM
P AXIS: 30 DEGREES
P-R INTERVAL: 166 MS
Q-T INTERVAL: 404 MS
QRS DURATION: 102 MS
QTC CALCULATION (BEZET): 429 MS
R AXIS: -37 DEGREES
T AXIS: 43 DEGREES
VENTRICULAR RATE: 68 BPM

## 2024-12-11 RX ORDER — ATORVASTATIN CALCIUM 40 MG/1
40 TABLET, FILM COATED ORAL DAILY
Qty: 90 TABLET | Refills: 3 | Status: SHIPPED | OUTPATIENT
Start: 2024-12-11

## 2024-12-11 RX ORDER — LOSARTAN POTASSIUM 25 MG/1
25 TABLET ORAL DAILY
Qty: 90 TABLET | Refills: 3 | Status: SHIPPED | OUTPATIENT
Start: 2024-12-11

## 2024-12-11 NOTE — PROGRESS NOTES
Subjective:   Luis Tamayo is a 64 year old male who presents for a IPPE (Initial Preventative Physical Exam) (Welcome to Medicare- < 12 months on Medicare) and scheduled follow up of multiple significant but stable problems.   New to medicare 11.1.2024.  Hypertension and hyperlipidemia, had a high salt meal yesterday and blood pressure is a little higher than normal    History/Other:   Fall Risk Assessment:   He has been screened for Falls and is low risk.      Cognitive Assessment:   He had a completely normal cognitive assessment - see flowsheet entries     Functional Ability/Status:   Luis Tamayo has a completely normal functional assessment. See flowsheet for details.      Depression Screening (PHQ):  PHQ-2 SCORE: 0  , done 12/11/2024   If you checked off any problems, how difficult have these problems made it for you to do your work, take care of things at home, or get along with other people?: Not difficult at all         Advanced Directives:   He does NOT have a Living Will. [Do you have a living will?: No]  He does NOT have a Power of  for Health Care. [Do you have a healthcare power of ?: No]  Discussed Advance Care Planning with patient (and family/surrogate if present). Standard forms made available to patient in After Visit Summary.      Patient Active Problem List   Diagnosis    Metabolic syndrome    Mixed hyperlipidemia    Essential hypertension    ED (erectile dysfunction)    Thrombocytopenia (HCC)    Family history of prostate cancer in father    Obesity (BMI 30.0-34.9)    Prediabetes     Allergies:  He has No Known Allergies.    Current Medications:  Outpatient Medications Marked as Taking for the 12/11/24 encounter (Office Visit) with Ze Rooney MD   Medication Sig    atorvastatin 40 MG Oral Tab Take 1 tablet (40 mg total) by mouth daily.    losartan 25 MG Oral Tab Take 1 tablet (25 mg total) by mouth daily.       Medical History:  He  has a past medical history of ED  (erectile dysfunction) (2012) and Laboratory examination ordered as part of a routine general medical examination (2012).  Surgical History:  He  has a past surgical history that includes femur/knee surg unlisted (1975) and tonsillectomy (1974).   Family History:  His family history includes Cancer in his father; Heart Disorder (age of onset: 52) in his sister; Heart Surgery in his father; Other in his mother.  Social History:  He  reports that he quit smoking about 39 years ago. His smoking use included cigarettes. He started smoking about 44 years ago. He has a 5 pack-year smoking history. He has quit using smokeless tobacco.  His smokeless tobacco use included chew. He reports current alcohol use of about 2.0 standard drinks of alcohol per week. He reports that he does not use drugs.    Tobacco:  He smoked tobacco in the past but quit greater than 12 months ago.  Social History     Tobacco Use   Smoking Status Former    Current packs/day: 0.00    Average packs/day: 1 pack/day for 5.0 years (5.0 ttl pk-yrs)    Types: Cigarettes    Start date: 1980    Quit date: 1985    Years since quittin.9   Smokeless Tobacco Former    Types: Chew   Tobacco Comments    Chew-quit 2010        CAGE Alcohol Screen:   CAGE screening score of 0 on 2024, showing low risk of alcohol abuse.      Patient Care Team:  Ze Rooney MD as PCP - General    Review of Systems   Constitutional: Negative.  Negative for activity change, appetite change, chills and fever.   HENT: Negative.     Eyes: Negative.    Respiratory: Negative.  Negative for shortness of breath.    Cardiovascular: Negative.  Negative for chest pain and palpitations.   Gastrointestinal: Negative.  Negative for abdominal pain.   Genitourinary: Negative.  Negative for dysuria.   Musculoskeletal:  Negative for arthralgias.   Skin: Negative.  Negative for rash.   Allergic/Immunologic: Negative.    Neurological: Negative.        Objective:    Physical Exam  Vitals and nursing note reviewed.   Constitutional:       General: He is not in acute distress.     Appearance: Normal appearance.   HENT:      Head: Normocephalic and atraumatic.      Right Ear: Tympanic membrane and external ear normal.      Left Ear: Tympanic membrane and external ear normal.      Nose: Nose normal.      Mouth/Throat:      Mouth: Mucous membranes are moist.   Eyes:      Extraocular Movements: Extraocular movements intact.      Pupils: Pupils are equal, round, and reactive to light.   Cardiovascular:      Rate and Rhythm: Normal rate and regular rhythm.      Pulses: Normal pulses.           Carotid pulses are 2+ on the right side and 2+ on the left side.       Radial pulses are 2+ on the right side and 2+ on the left side.        Dorsalis pedis pulses are 2+ on the right side and 2+ on the left side.        Posterior tibial pulses are 2+ on the right side and 2+ on the left side.      Heart sounds: Normal heart sounds, S1 normal and S2 normal. No murmur heard.  Pulmonary:      Effort: Pulmonary effort is normal.      Breath sounds: Normal breath sounds.   Abdominal:      General: Abdomen is flat. Bowel sounds are normal. There is no distension.      Palpations: Abdomen is soft.   Musculoskeletal:         General: Normal range of motion.      Cervical back: Normal range of motion and neck supple.      Right lower leg: No edema.      Left lower leg: No edema.   Skin:     General: Skin is warm and dry.      Capillary Refill: Capillary refill takes less than 2 seconds.   Neurological:      General: No focal deficit present.      Mental Status: He is alert and oriented to person, place, and time.   Psychiatric:         Mood and Affect: Mood normal.         Behavior: Behavior normal.         Thought Content: Thought content normal.       /88   Pulse 78   Resp 14   Ht 6' 1\" (1.854 m)   Wt 250 lb (113.4 kg)   BMI 32.98 kg/m²  Estimated body mass index is 32.98 kg/m² as  calculated from the following:    Height as of this encounter: 6' 1\" (1.854 m).    Weight as of this encounter: 250 lb (113.4 kg).    Medicare Hearing Assessment:   Hearing Screening    Screening Method: Whisper Test  Whisper Test Result: Pass         Visual Acuity:   Right Eye Visual Acuity: Corrected Right Eye Chart Acuity: 20/100   Left Eye Visual Acuity: Corrected Left Eye Chart Acuity: 20/40   Both Eyes Visual Acuity: Corrected Both Eyes Chart Acuity: 20/40   Able To Tolerate Visual Acuity: Yes        Assessment & Plan:   Luis Tamayo is a 64 year old male who presents for a Medicare Assessment.     1. Annual physical exam (Primary)  -     EKG with interpretation and Report -IN OFFICE [31775]  2. Thrombocytopenia (HCC)  Overview:  Average 125, idiopathic  Assessment & Plan:  Last Platelet value was 145 done 11/25/2024. Lowest level in the last 10 years was 103. Stable, continue present management and continue to monitor for progression   3. Mixed hyperlipidemia  Overview:  Atorvastatin 40  Assessment & Plan:  Cholesterol shows Good control. Long term heart-healthy diet and lifestyle discussed and encouraged to reduce risk of cardiovascular disease.  11/25/2024: Cholesterol, Total 148; HDL Cholesterol 43; Triglycerides 144; LDL Cholesterol 80  Cholesterol Meds: atorvastatin Tabs - 40 MG  stable  Continue with current treatment plan   Orders:  -     Atorvastatin Calcium; Take 1 tablet (40 mg total) by mouth daily.  Dispense: 90 tablet; Refill: 3  -     Detailed, Mod Complex (92302)  4. Essential hypertension  Overview:  Losartan 25  Assessment & Plan:  BP shows borderline control with last BP of 118/84. Work on lifestyle changes, diet, exercise and weight management.   11/25/2024: Potassium 4.2; Creatinine 1.11; eGFR-Cr 74  BP Meds: losartan Tabs - 25 MG   ACE/ARB Adherence Excellent at 99%    Orders:  -     Losartan Potassium; Take 1 tablet (25 mg total) by mouth daily.  Dispense: 90 tablet; Refill: 3  -      Detailed, Mod Complex (04631)  5. Obesity (BMI 30.0-34.9)  Overview:  BMI 31  Assessment & Plan:  Last BMI: 33.51, Class 1 Obesity.  He was counseled on diet and exercise for elevated BMI which is affecting his hypertension and hyperlipidemia.   Orders:  -     Detailed, Mod Complex (42479)  6. Prediabetes  Overview:  A1c 5.9% 11/2022    Assessment & Plan:  11/25/2024: HgbA1C 6.0 (H); Glucose 115 (H) Sugar control is stable  Continue with current treatment plan  Pre-Diabetic. Not currently on Diabetic meds.   Orders:  -     Detailed, Mod Complex (41868)  7. Family history of prostate cancer in father  Overview:  Father dx at 62, with seed implants  Assessment & Plan:  Stable, continue present management and continue to monitor for progression 11/25/2024: Prostate Specific Antigen Screen  1.48   8. Screening for colon cancer  -     Occult Blood, Fecal, FIT Immunoassay; Future; Expected date: 12/11/2024  9. Encounter for annual health examination    This time blood pressure readings and follow-up in 1 month if the readings are still elevated otherwise we will continue this dose, discussed prediabetes and prevention strategies  The patient indicates understanding of these issues and agrees to the plan.  Reinforced healthy diet, lifestyle, and exercise.      Return in 1 year (on 12/11/2025) for AWV with chonic condition follow up.     Ze Rooney MD, 12/11/2024     Supplementary Documentation:   General Health:  In the past six months, have you lost more than 10 pounds without trying?: 2 - No  Has your appetite been poor?: No  Type of Diet: Balanced  How does the patient maintain a good energy level?: Daily Walks;Other  How would you describe your daily physical activity?: Moderate  How would you describe your current health state?: Good  How do you maintain positive mental well-being?: Social Interaction;Puzzles;Games;Visiting Family  On a scale of 0 to 10, with 0 being no pain and 10 being severe pain, what is your  pain level?: 0 - (None)  In the past six months, have you experienced urine leakage?: 0-No  At any time do you feel concerned for the safety/well-being of yourself and/or your children, in your home or elsewhere?: No  Have you had any immunizations at another office such as Influenza, Hepatitis B, Tetanus, or Pneumococcal?: No    Health Maintenance   Topic Date Due    Colorectal Cancer Screening  12/04/2020    Annual Depression Screening  01/01/2024    Annual Physical  12/20/2024    PSA  11/25/2025    Influenza Vaccine  Completed    Fall Risk Screening (Annual)  Completed    Pneumococcal Vaccine: 65+ Years  Completed    Zoster Vaccines  Completed    COVID-19 Vaccine  Completed

## 2024-12-11 NOTE — PATIENT INSTRUCTIONS
Luis Tamayo's SCREENING SCHEDULE   Tests on this list are recommended by your physician but may not be covered, or covered at this frequency, by your insurer.   Please check with your insurance carrier before scheduling to verify coverage.   PREVENTATIVE SERVICES FREQUENCY &  COVERAGE DETAILS LAST COMPLETION DATE   Diabetes Screening    Fasting Blood Sugar / Glucose    One screening every 12 months if never tested or if previously tested but not diagnosed with pre-diabetes   One screening every 6 months if diagnosed with pre-diabetes Lab Results   Component Value Date     (H) 11/25/2024        Cardiovascular Disease Screening    Lipid Panel  Cholesterol  Lipoprotein (HDL)  Triglycerides Covered every 5 years for all Medicare beneficiaries without apparent signs or symptoms of cardiovascular disease Lab Results   Component Value Date    CHOLEST 148 11/25/2024    HDL 43 11/25/2024    LDL 80 11/25/2024    TRIG 144 11/25/2024         Electrocardiogram (EKG)   Covered if needed at Welcome to Medicare, and non-screening if indicated for medical reasons 12/11/2024      Ultrasound Screening for Abdominal Aortic Aneurysm (AAA) Covered once in a lifetime for one of the following risk factors   • Men who are 65-75 years old and have ever smoked   • Anyone with a family history -     Colorectal Cancer Screening  Covered for ages 50-85; only need ONE of the following:    Colonoscopy   Covered every 10 years    Covered every 2 years if patient is at high risk or previous colonoscopy was abnormal -    Health Maintenance   Topic Date Due   • Colorectal Cancer Screening  12/04/2020       Flexible Sigmoidoscopy   Covered every 4 years -    Fecal Occult Blood Test Covered annually -   Prostate Cancer Screening    Prostate-Specific Antigen (PSA) Annually Lab Results   Component Value Date    PSA 1.030 12/15/2017     Health Maintenance   Topic Date Due   • PSA  11/25/2025      Immunizations    Influenza Covered once per flu  season  Please get every year 10/28/2024  No recommendations at this time    Pneumococcal Each vaccine (Tikohnb36 & Njyrhamcv28) covered once after 65 Prevnar 13: -    [id number]: -     No recommendations at this time    Hepatitis B One screening covered for patients with certain risk factors   -  No recommendations at this time    Tetanus Toxoid Not covered by Medicare Part B unless medically necessary (cut with metal); may be covered with your pharmacy prescription benefits -    Tetanus, Diptheria and Pertusis TD and TDaP Not covered by Medicare Part B -  No recommendations at this time    Zoster Not covered by Medicare Part B; may be covered with your pharmacy  prescription benefits -  No recommendations at this time     Annual Monitoring of Persistent Medications (ACE/ARB, digoxin diuretics, anticonvulsants)    Potassium Annually Lab Results   Component Value Date    K 4.2 11/25/2024         Creatinine   Annually Lab Results   Component Value Date    CREATSERUM 1.11 11/25/2024         BUN Annually Lab Results   Component Value Date    BUN 15 11/25/2024       Drug Serum Conc Annually No results found for: \"DIGOXIN\", \"DIG\", \"VALP\"

## 2024-12-30 ENCOUNTER — TELEPHONE (OUTPATIENT)
Dept: FAMILY MEDICINE CLINIC | Facility: CLINIC | Age: 64
End: 2024-12-30

## 2024-12-30 DIAGNOSIS — I10 ESSENTIAL HYPERTENSION: Primary | ICD-10-CM

## 2024-12-30 RX ORDER — LOSARTAN POTASSIUM 50 MG/1
50 TABLET ORAL DAILY
Qty: 90 TABLET | Refills: 3 | Status: SHIPPED | OUTPATIENT
Start: 2024-12-30

## 2024-12-30 NOTE — TELEPHONE ENCOUNTER
Pt is calling with his home BP readings   12/12: 158/83  12/13 150/75  12/14 153/92  12/15 139/83  12/16 154/81  12/17 148/80  12/30 158/89

## 2025-01-02 NOTE — TELEPHONE ENCOUNTER
Follow up on medication change. Losartan increased to 50 mg. Pt has picked up med and will update BP's to office.

## 2025-07-01 ENCOUNTER — PATIENT OUTREACH (OUTPATIENT)
Dept: FAMILY MEDICINE CLINIC | Facility: CLINIC | Age: 65
End: 2025-07-01

## (undated) NOTE — LETTER
21    PATIENT NAME: Aline Franco   : 1960       Waiver      DATE: 2021     Dear Patient,    Thank you for choosing [facility] as your health care provider.  Your physician has deemed the following medical service(s) necessary

## (undated) NOTE — LETTER
[address] [place] [address]  [phone]       PATIENT NAME: Aurora Dickens   : 1960       Waiver      DATE: 2022     Dear Patient,    Thank you for choosing [id number] as your health care provider. Your physician has deemed the following medical service(s) necessary. However, your insurance plan may not pay for all of your health care and costs and may deny payment for this service. The fact that your insurance plan does not pay for an item or service does not mean you should not receive it. The purpose of this form is to help you make an informed decision about whether or not you want to receive this service(s) that may not be paid for by your insurance plan. ESTIMATED COST: Shingrix: $215  Administration: $48  Total: $263          I understand that the above mentioned service(s) or supply may not be covered by my insurance company. I agree to be financially responsible for the cost of this service or supply in the event my insurance denies payment as a non-covered benefit.       Patient/Insured Signature: ___________________________________________

## (undated) NOTE — LETTER
[address], [address] [place] [address], [facility] Jairo Banegas Fees [phone]       PATIENT NAME: Ran Nguyen   : 1960       Waiver      DATE: 2020     Dear Patient,    Thank you for choosing [facility]